# Patient Record
Sex: FEMALE | Race: WHITE | NOT HISPANIC OR LATINO | Employment: UNEMPLOYED | URBAN - METROPOLITAN AREA
[De-identification: names, ages, dates, MRNs, and addresses within clinical notes are randomized per-mention and may not be internally consistent; named-entity substitution may affect disease eponyms.]

---

## 2023-03-27 PROBLEM — I89.0 LYMPHEDEMA OF FACE: Status: ACTIVE | Noted: 2023-03-27

## 2023-03-27 PROBLEM — R53.83 FATIGUE: Status: ACTIVE | Noted: 2023-03-27

## 2023-03-27 PROBLEM — J45.909 ASTHMA (HHS-HCC): Status: ACTIVE | Noted: 2023-03-27

## 2023-03-27 PROBLEM — B00.1 RECURRENT COLD SORES: Status: ACTIVE | Noted: 2023-03-27

## 2023-03-27 PROBLEM — R73.09 ELEVATED GLUCOSE: Status: ACTIVE | Noted: 2023-03-27

## 2023-03-27 PROBLEM — I89.0 LYMPHEDEMA: Status: ACTIVE | Noted: 2023-03-27

## 2023-03-27 PROBLEM — R79.89 ELEVATED LFTS: Status: ACTIVE | Noted: 2023-03-27

## 2023-03-27 PROBLEM — R05.8 POST-VIRAL COUGH SYNDROME: Status: ACTIVE | Noted: 2023-03-27

## 2023-03-27 PROBLEM — M43.6 NECK STIFFNESS: Status: ACTIVE | Noted: 2023-03-27

## 2023-03-27 PROBLEM — R49.0 VOICE HOARSENESS: Status: ACTIVE | Noted: 2023-03-27

## 2023-03-27 PROBLEM — J31.0 RHINITIS: Status: ACTIVE | Noted: 2023-03-27

## 2023-03-27 PROBLEM — D05.90 BREAST CANCER IN SITU: Status: ACTIVE | Noted: 2023-03-27

## 2023-03-27 PROBLEM — R05.9 COUGH: Status: ACTIVE | Noted: 2023-03-27

## 2023-03-27 PROBLEM — M62.81 GENERALIZED MUSCLE WEAKNESS: Status: ACTIVE | Noted: 2023-03-27

## 2023-03-27 PROBLEM — I97.2 LYMPHEDEMA SYNDROME, POSTMASTECTOMY: Status: ACTIVE | Noted: 2023-03-27

## 2023-03-27 RX ORDER — ALBUTEROL SULFATE 90 UG/1
1-2 AEROSOL, METERED RESPIRATORY (INHALATION) AS NEEDED
COMMUNITY
Start: 2015-09-29 | End: 2023-12-07 | Stop reason: SDUPTHER

## 2023-03-27 RX ORDER — CHOLECALCIFEROL (VITAMIN D3) 50 MCG
TABLET ORAL
COMMUNITY
Start: 2015-07-08

## 2023-03-27 RX ORDER — FLUTICASONE PROPIONATE 50 MCG
1-2 SPRAY, SUSPENSION (ML) NASAL DAILY
COMMUNITY

## 2023-03-27 RX ORDER — FLUTICASONE PROPIONATE 110 UG/1
1 AEROSOL, METERED RESPIRATORY (INHALATION)
COMMUNITY
End: 2023-05-09 | Stop reason: CLARIF

## 2023-03-28 ENCOUNTER — OFFICE VISIT (OUTPATIENT)
Dept: PRIMARY CARE | Facility: CLINIC | Age: 58
End: 2023-03-28
Payer: COMMERCIAL

## 2023-03-28 VITALS
HEART RATE: 100 BPM | BODY MASS INDEX: 26.61 KG/M2 | HEIGHT: 66 IN | SYSTOLIC BLOOD PRESSURE: 141 MMHG | DIASTOLIC BLOOD PRESSURE: 85 MMHG | OXYGEN SATURATION: 97 % | TEMPERATURE: 97.8 F | WEIGHT: 165.6 LBS

## 2023-03-28 DIAGNOSIS — J02.9 SORE THROAT: Primary | ICD-10-CM

## 2023-03-28 DIAGNOSIS — B30.9 VIRAL CONJUNCTIVITIS OF BOTH EYES: ICD-10-CM

## 2023-03-28 PROCEDURE — 1036F TOBACCO NON-USER: CPT | Performed by: STUDENT IN AN ORGANIZED HEALTH CARE EDUCATION/TRAINING PROGRAM

## 2023-03-28 PROCEDURE — 99213 OFFICE O/P EST LOW 20 MIN: CPT | Performed by: STUDENT IN AN ORGANIZED HEALTH CARE EDUCATION/TRAINING PROGRAM

## 2023-03-28 RX ORDER — AMOXICILLIN AND CLAVULANATE POTASSIUM 875; 125 MG/1; MG/1
875 TABLET, FILM COATED ORAL 2 TIMES DAILY
Qty: 20 TABLET | Refills: 0 | Status: SHIPPED | OUTPATIENT
Start: 2023-03-28 | End: 2023-04-07

## 2023-03-28 RX ORDER — TOBRAMYCIN 3 MG/ML
1 SOLUTION/ DROPS OPHTHALMIC 4 TIMES DAILY
COMMUNITY
End: 2023-12-21 | Stop reason: ALTCHOICE

## 2023-03-28 ASSESSMENT — ENCOUNTER SYMPTOMS
LOSS OF SENSATION IN FEET: 0
COUGH: 1
RHINORRHEA: 1
NAUSEA: 0
OCCASIONAL FEELINGS OF UNSTEADINESS: 0
VOMITING: 0
SHORTNESS OF BREATH: 0
DEPRESSION: 0
CHILLS: 0
SORE THROAT: 1
FEVER: 0
DIAPHORESIS: 0

## 2023-03-28 ASSESSMENT — PATIENT HEALTH QUESTIONNAIRE - PHQ9
2. FEELING DOWN, DEPRESSED OR HOPELESS: NOT AT ALL
SUM OF ALL RESPONSES TO PHQ9 QUESTIONS 1 AND 2: 0
1. LITTLE INTEREST OR PLEASURE IN DOING THINGS: NOT AT ALL

## 2023-03-28 NOTE — ASSESSMENT & PLAN NOTE
Symptoms seem most c/w viral conjunctivitis. Can continue tobramycin drops for total of 10 days but suspect this is mostly viral. F/u if no improvement. Advised good hand hygiene and dab not rub the eyes.

## 2023-03-28 NOTE — ASSESSMENT & PLAN NOTE
Given sore throat and ear discharge and also travelling to [place] tomorrow will empirically treat with course of augmentin x10 days given unable to follow up. This should cover strep throat as well as most bacterial URIs. Advised common side effects of nausea, diarrhea, and advised probiotic/yogurt with abx. She will do home COVID test.

## 2023-04-13 ENCOUNTER — TELEPHONE (OUTPATIENT)
Dept: PRIMARY CARE | Facility: CLINIC | Age: 58
End: 2023-04-13
Payer: COMMERCIAL

## 2023-04-13 DIAGNOSIS — R05.3 CHRONIC COUGH: Primary | ICD-10-CM

## 2023-04-13 NOTE — TELEPHONE ENCOUNTER
Patient states that she has been using her steroid inhaler 1 in AM and 1 in PM. Patient also has been using albuterol inhaler approx 4x/day. Patient states that Monday night her Oxygen level went down to 91%, currently it is 95%. Is there something else she can do to help alleviate the symptoms? Please advise at home number.

## 2023-04-26 ENCOUNTER — TELEPHONE (OUTPATIENT)
Dept: PRIMARY CARE | Facility: CLINIC | Age: 58
End: 2023-04-26
Payer: COMMERCIAL

## 2023-04-26 NOTE — TELEPHONE ENCOUNTER
4/26/23 cl spoke with pt gave her main referral number [phone] to schedule  POS- pulmonary function test and referral for pulmonology

## 2023-05-09 DIAGNOSIS — J45.20 MILD INTERMITTENT ASTHMA WITHOUT COMPLICATION (HHS-HCC): Primary | ICD-10-CM

## 2023-05-09 RX ORDER — BUDESONIDE 90 UG/1
1 AEROSOL, POWDER RESPIRATORY (INHALATION)
COMMUNITY
End: 2023-05-09 | Stop reason: SDUPTHER

## 2023-05-09 RX ORDER — BUDESONIDE 90 UG/1
1 AEROSOL, POWDER RESPIRATORY (INHALATION)
Qty: 1 EACH | Refills: 3 | Status: SHIPPED | OUTPATIENT
Start: 2023-05-09 | End: 2024-02-06 | Stop reason: WASHOUT

## 2023-05-16 ENCOUNTER — TELEPHONE (OUTPATIENT)
Dept: PRIMARY CARE | Facility: CLINIC | Age: 58
End: 2023-05-16

## 2023-05-16 NOTE — TELEPHONE ENCOUNTER
Pt is calling for the results of her PFT test.    No results in EMR or Epic yet.    Pt had it done on 05.04    Called and let her know that the results take about 2 weeks to come back.    Verbal understanding.     movement/positioning

## 2023-06-28 ENCOUNTER — TELEPHONE (OUTPATIENT)
Dept: PRIMARY CARE | Facility: CLINIC | Age: 58
End: 2023-06-28
Payer: COMMERCIAL

## 2023-06-28 NOTE — TELEPHONE ENCOUNTER
Pt had a lung function test about 6 or 8 weeks ago - would like a call back on the results - please

## 2023-06-28 NOTE — TELEPHONE ENCOUNTER
Please review and advise.    See message from 06/21 also, was this already reviewed?    I see a orders only but there is nothing in the encounter.

## 2023-10-03 ENCOUNTER — TREATMENT (OUTPATIENT)
Dept: OCCUPATIONAL THERAPY | Facility: CLINIC | Age: 58
End: 2023-10-03
Payer: COMMERCIAL

## 2023-10-03 DIAGNOSIS — R29.898 WEAKNESS OF LEFT ARM: Primary | ICD-10-CM

## 2023-10-03 DIAGNOSIS — S52.502A CLOSED FRACTURE OF LEFT DISTAL RADIUS: ICD-10-CM

## 2023-10-03 PROCEDURE — 97112 NEUROMUSCULAR REEDUCATION: CPT | Mod: GO

## 2023-10-03 NOTE — PROGRESS NOTES
Assessed    · Distal radius fracture, left (813.42) (S52.502A)   · Generalized muscle weakness (728.87) (M62.81)   · Weakness of left arm (729.89) (R29.898)    Plan    Intervention plan include:. Continue with occupational therapy progress with motion and strengthening. Progress towards ADL and home/work independence along with functional strengthening. OT to continue this date with lymphedema treatment provided by clinical specialist being continued as scheduled by client.   Frequency and duration:.2 times a week. This is under her approved limit of 60 visits a year.     Will continue to provide alternate treatment of client with OT clinicians for this DX and her lymphedema treatment. Recommend continued left wrist therapy to promote functional return.    Progress with POC, as tolerated.    Monitor home program.      Assessment      Patients Response to Today's Treatment: hypersensitivity: decreased, joint mobility/rom: increased, strength: increased and pain/symptoms: decreased. flexibility: increased   Patient was able to verbalized precautions and demonstrated/taught back good understanding of personalized exercise program as verbalized, demonstrated and/or provided in printed format.   The client tolerated well. Gross wrist mobility against gravity and sustained light mobilizations being tolerated. Supination gains with increased positional tolerance and functional ability continue to be noted. All therapeutic exercises, modalities and activities performed this date to reduce pain, increase hand function, increase strength and independence in ADL and IADL tasks.   Patient was able to complete today's treatment with some difficulty.      Adult Risk Screening  There are no spiritual/cultural practices/values/needs that are important to know   Initial Fall Risk Screening:   NIDIA MERINO has fallen in the last 6 months. Her fall resulted in the following injury: . NIDIA MERINO does not have a fear of falling. She does not  need assistance with sitting, standing or walking. Does not need assistance walking in her home. She does not need assistance in an unfamiliar setting. The patient is not using an assistive device.             Insurance    Insurance reviewed   Visit number: 38   Approved number of visits: 60   20th for left wrist specific treatment after distal radius fracture.        Subjective    Patient reports:.   I use the arm a lot over the weekend.  Objective     Not Limited Improving Painful Limited   Carrying  X  X   Gripping  X X X   Lifting  X X X   Manipulation  X  X   Pinching  X  X   Pulling  X  X   Pushing  X  X   Reaching  X  X       Completing increased home tasks with less pain. AROM is increasing for supination of the forearm and flexion of the wrist. Less painful while returning to pronation. Noted increased movement tolerance that incorporates the left arm with supination/pronation exercises and elbow flexion curls. Left  22 pounds, right  45 pounds.     Treatment    Time in clinic started at 1300  Time in clinic ended at 1340  Total time in clinic is 40 minutes.      Neuromuscular Re-education (43525): timed minutes 40 . Sustained safe stretch with increase task tolerance and performance. Supination increasing. Intrinsic stretches, extrinsic stretches, edema control technique’s, range-of-motion activities, home program education, and strengthening activities.     Nabil Bahena OTR/L, CHT

## 2023-10-06 ENCOUNTER — APPOINTMENT (OUTPATIENT)
Dept: OCCUPATIONAL THERAPY | Facility: CLINIC | Age: 58
End: 2023-10-06
Payer: COMMERCIAL

## 2023-10-10 ENCOUNTER — APPOINTMENT (OUTPATIENT)
Dept: OCCUPATIONAL THERAPY | Facility: CLINIC | Age: 58
End: 2023-10-10
Payer: COMMERCIAL

## 2023-10-11 ENCOUNTER — APPOINTMENT (OUTPATIENT)
Dept: OCCUPATIONAL THERAPY | Facility: CLINIC | Age: 58
End: 2023-10-11
Payer: COMMERCIAL

## 2023-10-13 ENCOUNTER — APPOINTMENT (OUTPATIENT)
Dept: OCCUPATIONAL THERAPY | Facility: CLINIC | Age: 58
End: 2023-10-13
Payer: COMMERCIAL

## 2023-10-17 ENCOUNTER — TREATMENT (OUTPATIENT)
Dept: OCCUPATIONAL THERAPY | Facility: CLINIC | Age: 58
End: 2023-10-17
Payer: COMMERCIAL

## 2023-10-17 DIAGNOSIS — R29.898 WEAKNESS OF LEFT ARM: Primary | ICD-10-CM

## 2023-10-17 DIAGNOSIS — S52.502A CLOSED FRACTURE OF LEFT DISTAL RADIUS: ICD-10-CM

## 2023-10-17 PROCEDURE — 97112 NEUROMUSCULAR REEDUCATION: CPT | Mod: GO

## 2023-10-17 NOTE — PROGRESS NOTES
Assessed    · Distal radius fracture, left (813.42) (S52.502A)   · Generalized muscle weakness (728.87) (M62.81)   · Weakness of left arm (729.89) (R29.898)    Plan    Intervention plan include:. Continue with occupational therapy progress with motion and strengthening. Progress towards ADL and home/work independence along with functional strengthening. OT to continue this date with lymphedema treatment provided by clinical specialist being continued as scheduled by client.   Frequency and duration:. 2 times a week. This is under her approved limit of 60 visits a year.     Will continue to provide alternate treatment of client with OT clinicians for this DX and her lymphedema treatment. Recommend continued left wrist therapy to promote functional return.    Progress with POC, as tolerated.    Monitor home program.      Assessment      Patients Response to Today's Treatment: hypersensitivity: decreased, joint mobility/rom: increased, strength: increased and pain/symptoms: decreased. flexibility: increased   Patient was able to verbalized precautions and demonstrated/taught back good understanding of personalized exercise program as verbalized, demonstrated and/or provided in printed format.   The client tolerated well. Gross wrist mobility against gravity and sustained light mobilizations being tolerated. Supination gains with increased positional tolerance and functional ability continue to be noted. All therapeutic exercises, modalities and activities performed this date to reduce pain, increase hand function, increase strength and independence in ADL and IADL tasks.   Patient was able to complete today's treatment with some difficulty.      Adult Risk Screening  There are no spiritual/cultural practices/values/needs that are important to know.  No apparent abuse or neglect is noted, no suicidal ideation or self-harm plans referenced.    Initial Fall Risk Screening:   NIDIA MERINO has fallen in the last 6 months. Her  fall resulted in the following injury: . NIDIA MERINO does not have a fear of falling. She does not need assistance with sitting, standing or walking. Does not need assistance walking in her home. She does not need assistance in an unfamiliar setting. The patient is not using an assistive device.             Insurance    Insurance reviewed   Visit number: 39   Approved number of visits: 60   21th for left wrist specific treatment after distal radius fracture.        Subjective    Patient reports:.   I use the arm a lot over the weekend.  Objective:    Left  24 pounds, right  45 pounds.     Not Limited Improving Painful Limited   Carrying  X  X   Gripping  X X X   Lifting  X X X   Manipulation  X  X   Pinching  X  X   Pulling  X  X   Pushing  X  X   Reaching  X  X       Completing increased home tasks with less pain. AROM is increasing for supination of the forearm and flexion of the wrist. Less painful while returning to pronation. Noted increased movement tolerance that incorporates the left arm with supination/pronation exercises and elbow flexion curls.      Treatment    Time in clinic started at 1415  Time in clinic ended at 1455  Total time in clinic is 40 minutes.      Neuromuscular Re-education (65349): timed minutes 40 . Sustained safe stretch with increase task tolerance and performance. Supination increasing. Intrinsic stretches, extrinsic stretches, edema control technique’s, range-of-motion activities, home program education, and strengthening activities.     Nabil Bahena OTR/L, CHT

## 2023-10-23 ENCOUNTER — TREATMENT (OUTPATIENT)
Dept: OCCUPATIONAL THERAPY | Facility: CLINIC | Age: 58
End: 2023-10-23
Payer: COMMERCIAL

## 2023-10-23 DIAGNOSIS — R29.898 WEAKNESS OF LEFT ARM: Primary | ICD-10-CM

## 2023-10-23 DIAGNOSIS — S52.502A CLOSED FRACTURE OF LEFT DISTAL RADIUS: ICD-10-CM

## 2023-10-23 DIAGNOSIS — I89.0 LYMPHEDEMA: ICD-10-CM

## 2023-10-23 PROCEDURE — 97112 NEUROMUSCULAR REEDUCATION: CPT | Mod: GO

## 2023-10-23 NOTE — PROGRESS NOTES
Assessed    · Distal radius fracture, left (813.42) (S52.502A)   · Generalized muscle weakness (728.87) (M62.81)   · Weakness of left arm (729.89) (R29.898)    Plan    Intervention plan include:. Continue with occupational therapy progress with motion and strengthening. Progress towards ADL and home/work independence along with functional strengthening. OT to continue this date with lymphedema treatment provided by clinical specialist being continued as scheduled by client.   Frequency and duration:. 2 times a week. This is under her approved limit of 60 visits a year.     Will continue to provide alternate treatment of client with OT clinicians for this DX and her lymphedema treatment. Recommend continued left wrist therapy to promote functional return.    Progress with POC, as tolerated.    Monitor home program.      Assessment      Patients Response to Today's Treatment: hypersensitivity: decreased, joint mobility/rom: increased, strength: increased and pain/symptoms: decreased. flexibility: increased   Patient was able to verbalized precautions and demonstrated/taught back good understanding of personalized exercise program as verbalized, demonstrated and/or provided in printed format.   The client tolerated well. Gross wrist mobility against gravity and sustained light mobilizations being tolerated. Supination gains with increased positional tolerance and functional ability continue to be noted. All therapeutic exercises, modalities and activities performed this date to reduce pain, increase hand function, increase strength and independence in ADL and IADL tasks.   Patient was able to complete today's treatment with some difficulty.      Adult Risk Screening  There are no spiritual/cultural practices/values/needs that are important to know.  No apparent abuse or neglect is noted, no suicidal ideation or self-harm plans referenced.    Initial Fall Risk Screening:   NIDIA MERINO has fallen in the last 6 months. Her  fall resulted in the following injury: . NIDIA MERINO does not have a fear of falling. She does not need assistance with sitting, standing or walking. Does not need assistance walking in her home. She does not need assistance in an unfamiliar setting. The patient is not using an assistive device.             Insurance    Insurance reviewed   Visit number: 40   Approved number of visits: 60   22th for left wrist specific treatment after distal radius fracture.        Subjective    Patient reports:.   I am having pain on the inside of the wrist.  The lymphedema is a little worse today.   Objective:     Not Limited Improving Painful Limited   Carrying  X  X   Gripping  X X X   Lifting  X X X   Manipulation  X  X   Pinching  X  X   Pulling  X  X   Pushing  X  X   Reaching  X  X       Pain complaints today after weekend use.  Strength remains limited but gains noted. AROM is increasing for supination of the forearm and flexion of the wrist. Supination 75 degrees.  Less painful while returning to pronation. Noted increased movement tolerance that incorporates the left arm with supination/pronation exercises and elbow flexion curls.      Treatment    Time in clinic started at 1520  Time in clinic ended at 1605  Total time in clinic is 45 minutes.      Neuromuscular Re-education (39212): timed minutes 45 . Sustained safe stretch with increase task tolerance and performance. Supination increasing. Intrinsic stretches, extrinsic stretches, edema control technique’s, range-of-motion activities, home program education, and strengthening activities.     Nabil Bahena OTR/L, CHT

## 2023-10-25 ENCOUNTER — TREATMENT (OUTPATIENT)
Dept: OCCUPATIONAL THERAPY | Facility: CLINIC | Age: 58
End: 2023-10-25
Payer: COMMERCIAL

## 2023-10-25 DIAGNOSIS — I89.0 LYMPHEDEMA OF FACE: Primary | ICD-10-CM

## 2023-10-25 DIAGNOSIS — I97.2 LYMPHEDEMA SYNDROME, POSTMASTECTOMY: ICD-10-CM

## 2023-10-25 DIAGNOSIS — I89.0 LYMPHEDEMA: ICD-10-CM

## 2023-10-25 DIAGNOSIS — M43.6 NECK STIFFNESS: ICD-10-CM

## 2023-10-25 PROCEDURE — 97535 SELF CARE MNGMENT TRAINING: CPT | Mod: GO

## 2023-10-25 PROCEDURE — 97140 MANUAL THERAPY 1/> REGIONS: CPT | Mod: GO

## 2023-10-25 NOTE — PROGRESS NOTES
Occupational Therapy    Occupational Therapy Treatment    Name: Miroslava Roman  MRN: [id number]  : 1965  Date: 10/25/23  Visit #41   Approved number of visits 60    Assessment:  Abdominal softening, still full.  Left arm less tight, more comfortable per pt.  Face and neck less full, less discomfort, voice clearer, less strained.     Plan:  Frequency and duration:. Continue occupational therapy for modified trunk, abdominal, head and neck, UE lymphedema complete decongestive therapy (CDT), exercises, stretches for decongestion and mobility, pain management, increase optimal function; 1x for every other week. Increase frequency as needed. Modify, upgrade tx/strategies due to pt fluctuating swelling and pain levels. Support voice function.       Subjective   Pt in abdominal distress, has distention (bowel, lymphatic). Legs are swelling.  Left arm swollen, hand therapy with left hand stretching Monday.  Pt feels fullness in throat, scratchy.      Pain Assessment:  Abdominal pain reported.     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   Face: face (bilaterally) midface nose upper lip   Cheek:full bilaterally  Jowl:/Jaw line: full bilaterally   Lower Eyelid: puffy      Additional Information: Full abdomen, firm, distended, painful per pt  Face and neck full. Eyes full.   Post neck tight.  Bilateral axilla full.  Voice hoarse.  L arm, hand tight, full.           Treatment    Time in  1330   Time out 1425   Total time 55 minutes.     Manual Therapy 40   OT provide deep and superficial abdominal manual lymph drainage (MLD) to pt tolerance, abdomen tight, painful per pt.  OT clear inguinals, abdominal scar massage, clear axilla bilateral, clear chest, neck, face. Soft tissue stretches shoulder,  post neck.   OT provide LUE MLD.   Repeat abdominal MLD clearing after exercises.  Pt reported less fullness, tightness throughout post tx. Abdomen full, less pain.     Therapeutic exercise 15   Supine: knees to chest, hip rotations. Pt report this tight, slow pace with movement.  Supine hip ER/IR 10 reps x2, slow pace  Neck ROM, shoulder ROM when seated.  Exercises intermittent with MLD.  All exercises slow pace to pt tolerance.   Pt report use of large therapy ball (stretches, bouncing) at home.

## 2023-10-31 ENCOUNTER — TREATMENT (OUTPATIENT)
Dept: OCCUPATIONAL THERAPY | Facility: CLINIC | Age: 58
End: 2023-10-31
Payer: COMMERCIAL

## 2023-10-31 DIAGNOSIS — S52.502A CLOSED FRACTURE OF LEFT DISTAL RADIUS: ICD-10-CM

## 2023-10-31 DIAGNOSIS — M62.81 GENERALIZED MUSCLE WEAKNESS: ICD-10-CM

## 2023-10-31 DIAGNOSIS — R29.898 WEAKNESS OF LEFT ARM: Primary | ICD-10-CM

## 2023-10-31 PROCEDURE — 97112 NEUROMUSCULAR REEDUCATION: CPT | Mod: GO

## 2023-10-31 NOTE — PROGRESS NOTES
Occupational Therapy    Patient Name: Miroslava Roman  MRN: [id number]  Today's Date: 10/31/2023     Assessed    · Distal radius fracture, left (813.42) (S52.502A)   · Generalized muscle weakness (728.87) (M62.81)   · Weakness of left arm (729.89) (R29.898)    Plan    Intervention plan include:. Continue with occupational therapy progress with motion and strengthening. Progress towards ADL and home/work independence along with functional strengthening. OT to continue this date with lymphedema treatment provided by clinical specialist being continued as scheduled by client.   Frequency and duration:. 2 times a week. This is under her approved limit of 60 visits a year.     Will continue to provide alternate treatment of client with OT clinicians for this DX and her lymphedema treatment. Recommend continued left wrist therapy to promote functional return.    Progress with POC, as tolerated.    Monitor home program.      Assessment      Patients Response to Today's Treatment: hypersensitivity: decreased, joint mobility/rom: increased, strength: increased and pain/symptoms: decreased. flexibility: increased   Patient was able to verbalized precautions and demonstrated/taught back good understanding of personalized exercise program as verbalized, demonstrated and/or provided in printed format.   The client tolerated well. Gross wrist mobility against gravity and sustained light mobilizations being tolerated. Supination gains with increased positional tolerance and functional ability continue to be noted. Sustained supination to 80 degrees with limited rebound pain.  She did home lymphedema treatment to left forearm and wrist prior to attending today. All therapeutic exercises, modalities and activities performed this date to reduce pain, increase hand function, increase strength and independence in ADL and IADL tasks.   Patient was able to complete today's treatment with some difficulty.      Adult Risk Screening  There are  no spiritual/cultural practices/values/needs that are important to know.  No apparent abuse or neglect is noted, no suicidal ideation or self-harm plans referenced.      Initial Fall Risk Screening:   NIDIA MERINO has fallen in the last 6 months. Her fall resulted in the following injury: . NIDIA MERINO does not have a fear of falling. She does not need assistance with sitting, standing or walking. Does not need assistance walking in her home. She does not need assistance in an unfamiliar setting. The patient is not using an assistive device.             Insurance    Insurance reviewed   Visit number: 41   Approved number of visits: 60   23th for left wrist specific treatment after distal radius fracture.        Subjective    Patient reports:.   I am having pain on the inside of the wrist.  The lymphedema is a little worse today.   Objective:     Not Limited Improving Painful Limited   Carrying  X     Gripping  X X    Lifting  X X    Manipulation  X     Pinching  X     Pulling  X     Pushing  X     Reaching  X         Strength remains limited but gains noted.  Functional task tolerance increasing. AROM is increasing for supination of the forearm and flexion of the wrist. Supination 80 degrees.  Less painful while returning to pronation. Noted increased movement tolerance that incorporates the left arm with supination/pronation exercises and elbow flexion curls.      Treatment    Time in clinic started at 1430  Time in clinic ended at 1515  Total time in clinic is 45 minutes.      Neuromuscular Re-education (43838): timed minutes 45 . Sustained safe stretch with increase task tolerance and performance. Supination increasing. Intrinsic stretches, extrinsic stretches, edema control technique’s, range-of-motion activities, home program education, and strengthening activities.     Nabil Bahena OTR/L, CHT

## 2023-11-02 ENCOUNTER — TREATMENT (OUTPATIENT)
Dept: OCCUPATIONAL THERAPY | Facility: CLINIC | Age: 58
End: 2023-11-02
Payer: COMMERCIAL

## 2023-11-02 DIAGNOSIS — S52.572D OTHER CLOSED INTRA-ARTICULAR FRACTURE OF DISTAL END OF LEFT RADIUS WITH ROUTINE HEALING, SUBSEQUENT ENCOUNTER: ICD-10-CM

## 2023-11-02 DIAGNOSIS — R29.898 WEAKNESS OF LEFT ARM: Primary | ICD-10-CM

## 2023-11-02 PROCEDURE — 97112 NEUROMUSCULAR REEDUCATION: CPT | Mod: GO

## 2023-11-02 NOTE — PROGRESS NOTES
Occupational Therapy    Patient Name: Miroslava Roman  MRN: [id number]  Today's Date: 11/2/2023     Assessed    · Distal radius fracture, left (813.42) (S52.502A)   · Generalized muscle weakness (728.87) (M62.81)   · Weakness of left arm (729.89) (R29.898)    Plan    Intervention plan include:. Continue with occupational therapy progress with motion and strengthening. Progress towards ADL and home/work independence along with functional strengthening. OT to continue this date with lymphedema treatment provided by clinical specialist being continued as scheduled by client.   Frequency and duration:. 2 times a week. This is under her approved limit of 60 visits a year.     Will continue to provide alternate treatment of client with OT clinicians for this DX and her lymphedema treatment. Recommend continued left wrist therapy to promote functional return.    Progress with POC, as tolerated.    Monitor home program.      Assessment      Patients Response to Today's Treatment: hypersensitivity: decreased, joint mobility/rom: increased, strength: increased and pain/symptoms: decreased. flexibility: increased   Patient was able to verbalized precautions and demonstrated/taught back good understanding of personalized exercise program as verbalized, demonstrated and/or provided in printed format.   The client tolerated well. Gross wrist mobility against gravity and sustained light mobilizations being tolerated. Supination gains with increased positional tolerance and functional ability continue to be noted. Sustained supination to 80 degrees with limited rebound pain.  She did home lymphedema treatment to left forearm and wrist prior to attending today. All therapeutic exercises, modalities and activities performed this date to reduce pain, increase hand function, increase strength and independence in ADL and IADL tasks.   Patient was able to complete today's treatment with some difficulty.      Adult Risk Screening  There are  no spiritual/cultural practices/values/needs that are important to know.  No apparent abuse or neglect is noted, no suicidal ideation or self-harm plans referenced.      Initial Fall Risk Screening:   NIDIA MERINO has fallen in the last 6 months. Her fall resulted in the following injury: . NIDIA MERINO does not have a fear of falling. She does not need assistance with sitting, standing or walking. Does not need assistance walking in her home. She does not need assistance in an unfamiliar setting. The patient is not using an assistive device.             Insurance    Insurance reviewed   Visit number: 42   Approved number of visits: 60   24th for left wrist specific treatment after distal radius fracture.        Subjective    Patient reports:.   I am still not able to use the wrist to scoot up on the bead when I sit down.    Objective:     Not Limited Improving Painful Limited   Carrying  X     Gripping  X X    Lifting  X X    Manipulation  X     Pinching  X     Pulling  X     Pushing  X     Reaching  X         Strength remains limited but gains noted.  Functional task tolerance increasing. AROM is increasing for supination of the forearm and flexion of the wrist. Supination 80 degrees.  Wrist flexion 70 degrees, extension 65 degrees.  Less painful while returning to neutral position. Noted increased movement tolerance that incorporates the left arm with supination/pronation exercises and elbow flexion curls.      Treatment    Time in clinic started at 1430  Time in clinic ended at 1515  Total time in clinic is 45 minutes.      Neuromuscular Re-education (15669): timed minutes 45 . Sustained safe stretch with increase task tolerance and performance. Supination increasing. Intrinsic stretches, extrinsic stretches, edema control technique’s, range-of-motion activities, home program education, and strengthening activities.     Nabil Bahena OTR/L, CHT

## 2023-11-06 ENCOUNTER — TREATMENT (OUTPATIENT)
Dept: OCCUPATIONAL THERAPY | Facility: CLINIC | Age: 58
End: 2023-11-06
Payer: COMMERCIAL

## 2023-11-06 DIAGNOSIS — S52.572D OTHER CLOSED INTRA-ARTICULAR FRACTURE OF DISTAL END OF LEFT RADIUS WITH ROUTINE HEALING, SUBSEQUENT ENCOUNTER: ICD-10-CM

## 2023-11-06 DIAGNOSIS — R29.898 WEAKNESS OF LEFT ARM: Primary | ICD-10-CM

## 2023-11-06 DIAGNOSIS — M62.81 GENERALIZED MUSCLE WEAKNESS: ICD-10-CM

## 2023-11-06 PROCEDURE — 97112 NEUROMUSCULAR REEDUCATION: CPT | Mod: GO

## 2023-11-06 NOTE — PROGRESS NOTES
Occupational Therapy    Patient Name: Miroslava Roman  MRN: [id number]  Today's Date: 11/6/2023     Assessed    · Distal radius fracture, left (813.42) (S52.502A)   · Generalized muscle weakness (728.87) (M62.81)   · Weakness of left arm (729.89) (R29.898)    Plan    Intervention plan include:. Continue with occupational therapy progress with motion and strengthening. Progress towards ADL and home/work independence along with functional strengthening. OT to continue this date with lymphedema treatment provided by clinical specialist being continued as scheduled by client.   Frequency and duration:. 1 visit with discharge planning for next session for distal radius fracture. Other OT treatment to continue for lymphedema.     Progress with POC, as tolerated.    Monitor home program.      Assessment      Patients Response to Today's Treatment: hypersensitivity: decreased, joint mobility/rom: increased, strength: increased and pain/symptoms: decreased. flexibility: increased   Patient was able to verbalized precautions and demonstrated/taught back good understanding of personalized exercise program as verbalized, demonstrated and/or provided in printed format.   The client tolerated well. Gross wrist mobility against gravity and sustained light mobilizations being tolerated. Supination gains with increased positional tolerance and functional ability continue to be noted. Sustained supination to 80 degrees with limited rebound pain.  She performed another home lymphedema treatment to left forearm and wrist prior to attending today. All therapeutic exercises, modalities and activities performed this date to reduce pain, increase hand function, increase strength and independence in ADL and IADL tasks.   Patient was able to complete today's treatment with some difficulty.      Adult Risk Screening  There are no spiritual/cultural practices/values/needs that are important to know.  No apparent abuse or neglect is noted, no  suicidal ideation or self-harm plans referenced.      Initial Fall Risk Screening:   NIDIA MERINO has fallen in the last 6 months. Her fall resulted in the following injury: . NIDIA MERINO does not have a fear of falling. She does not need assistance with sitting, standing or walking. Does not need assistance walking in her home. She does not need assistance in an unfamiliar setting. The patient is not using an assistive device.             Insurance    Insurance reviewed   Visit number: 43   Approved number of visits: 60   25th for left wrist specific treatment after distal radius fracture.        Subjective    Patient reports:.   I am still not able to use the wrist to scoot up on the bead when I sit down.    Objective:     Not Limited Improving Painful Limited   Carrying  X     Gripping  X X    Lifting  X X    Manipulation  X     Pinching  X     Pulling  X     Pushing  X     Reaching  X         Strength remains limited but gains noted.  Functional task tolerance increasing. AROM is increasing for supination of the forearm and flexion of the wrist. Supination 80 degrees.  Wrist flexion 70 degrees, extension 70 degrees.  Left  30 pounds, right  48 pounds.  Noted increased movement tolerance that incorporates the left arm with supination/pronation exercises and elbow flexion curls.      Treatment    Time in clinic started at 1445  Time in clinic ended at 1525  Total time in clinic is 40 minutes.      Neuromuscular Re-education (31795): timed minutes 40 . Sustained safe stretch with increase task tolerance and performance. Supination increasing. Intrinsic stretches, extrinsic stretches, edema control technique’s, range-of-motion activities, home program education, and strengthening activities.     Nabil Bahena OTR/L, CHT

## 2023-11-08 ENCOUNTER — TREATMENT (OUTPATIENT)
Dept: OCCUPATIONAL THERAPY | Facility: CLINIC | Age: 58
End: 2023-11-08
Payer: COMMERCIAL

## 2023-11-08 DIAGNOSIS — I97.2 LYMPHEDEMA SYNDROME, POSTMASTECTOMY: ICD-10-CM

## 2023-11-08 DIAGNOSIS — I89.0 LYMPHEDEMA: ICD-10-CM

## 2023-11-08 DIAGNOSIS — M43.6 NECK STIFFNESS: ICD-10-CM

## 2023-11-08 DIAGNOSIS — I89.0 LYMPHEDEMA OF FACE: Primary | ICD-10-CM

## 2023-11-08 PROCEDURE — 97140 MANUAL THERAPY 1/> REGIONS: CPT | Mod: GO,CO

## 2023-11-08 PROCEDURE — 97110 THERAPEUTIC EXERCISES: CPT | Mod: GO,CO

## 2023-11-08 NOTE — PROGRESS NOTES
"Occupational Therapy    Occupational Therapy Treatment    Name: Mirsolava Roman  MRN: [id number]  : 1965  Date: 23  Visit #45Time Calculation  Start Time: 1103  Stop Time: 1200  Time Calculation (min): 57 min  Approved number of visits 60    Assessment:  Abdominal softening, still full.  Left arm less tight, more comfortable per pt.  Face and neck less full, less discomfort, voice clearer, less strained.     Plan:  Frequency and duration:. Continue occupational therapy for modified trunk, abdominal, head and neck, UE lymphedema complete decongestive therapy (CDT), exercises, stretches for decongestion and mobility, pain management, increase optimal function; 1x for every other week. Increase frequency as needed. Modify, upgrade tx/strategies due to pt fluctuating swelling and pain levels. Support voice function.       Subjective   Pt states she has not been working on strengthening as much recently but has continued to stretch. Pt states she is hoping to get back into strengthening as she feels lymphatic fluid building up in legs and abdomen.  Pain Assessment:       Objective    Therapy Diagnosis  1. Lymphedema of face        2. Lymphedema        3. Neck stiffness        4. Lymphedema syndrome, postmastectomy               Objective       Head and neck edema located in the following area(s): .   Face: face (bilaterally) midface nose upper lip   Cheek:full bilaterally  Jowl:/Jaw line: full bilaterally   Lower Eyelid: puffy      Additional Information: Full abdomen, firm, distended, painful per pt  Face and neck full. Eyes full.   Post neck tight.  Bilateral axilla full.  Voice hoarse.  L arm, hand tight, full.           Treatment      Manual Therapy 47   GARCIA provides deep abdominal MLD, B LE MLD, L UE, head and neck MLD. Good tissue texture softening post MLD. Pt states she feels less tight, \"like things are moving\". Decreased tightness, pressure on abdomen.     Therapeutic exercise 10  Supine: knees to " chest, hip rotations.  Stationary bike 5 minutes

## 2023-11-09 ENCOUNTER — APPOINTMENT (OUTPATIENT)
Dept: OCCUPATIONAL THERAPY | Facility: CLINIC | Age: 58
End: 2023-11-09
Payer: COMMERCIAL

## 2023-11-10 ENCOUNTER — TREATMENT (OUTPATIENT)
Dept: OCCUPATIONAL THERAPY | Facility: CLINIC | Age: 58
End: 2023-11-10
Payer: COMMERCIAL

## 2023-11-10 DIAGNOSIS — M43.6 NECK STIFFNESS: ICD-10-CM

## 2023-11-10 DIAGNOSIS — R29.898 WEAKNESS OF LEFT ARM: ICD-10-CM

## 2023-11-10 DIAGNOSIS — I89.0 LYMPHEDEMA OF FACE: ICD-10-CM

## 2023-11-10 DIAGNOSIS — I97.2 LYMPHEDEMA SYNDROME, POSTMASTECTOMY: ICD-10-CM

## 2023-11-10 DIAGNOSIS — I89.0 LYMPHEDEMA: Primary | ICD-10-CM

## 2023-11-10 PROCEDURE — 97140 MANUAL THERAPY 1/> REGIONS: CPT | Mod: GO | Performed by: OCCUPATIONAL THERAPIST

## 2023-11-10 NOTE — PROGRESS NOTES
Occupational Therapy    Occupational Therapy Treatment    Name: Miroslava Roman  MRN: [id number]  : 1965  Date: 11/10/23  Visit #46  Approved number of visits 60    Assessment:  Pt with noted decreased fullness and muscular tightness post treatment.  Patient reported improved mobility in BLES post MLD    Plan:  Frequency and duration:. Continue occupational therapy for modified trunk, abdominal, head and neck, UE lymphedema complete decongestive therapy (CDT), exercises, stretches for decongestion and mobility, pain management, increase optimal function; 1x for every other week. Increase frequency as needed. Modify, upgrade tx/strategies due to pt fluctuating swelling and pain levels. Support voice function.       Subjective   Pt reports her legs feel less tight after last treatment        Objective    Therapy Diagnosis  1. Lymphedema        2. Lymphedema syndrome, postmastectomy        3. Neck stiffness        4. Lymphedema of face        5. Weakness of left arm                 Objective       Head and neck edema located in the following area(s): .   Face: face (bilaterally) midface nose upper lip   Cheek:full bilaterally  Jowl:/Jaw line: full bilaterally   Lower Eyelid: puffy      Additional Information: Full abdomen, firm, distended, painful per pt  Face and neck full. Eyes full.   Post neck tight.  Bilateral axilla full.  Voice hoarse.  L arm, hand tight, full.           Treatment      Manual Therapy 55 minutes  OT provided manual lymph drainage to abdomen, neck, BUEs and BLES  Pt with noted decreased fullness post treatment      Therapeutic exercise 3 minutes     Stationary bike 3 minutes

## 2023-11-13 ENCOUNTER — TREATMENT (OUTPATIENT)
Dept: OCCUPATIONAL THERAPY | Facility: CLINIC | Age: 58
End: 2023-11-13
Payer: COMMERCIAL

## 2023-11-13 DIAGNOSIS — M62.81 GENERALIZED MUSCLE WEAKNESS: Primary | ICD-10-CM

## 2023-11-13 DIAGNOSIS — I89.0 LYMPHEDEMA OF FACE: ICD-10-CM

## 2023-11-13 DIAGNOSIS — R29.898 WEAKNESS OF LEFT ARM: ICD-10-CM

## 2023-11-13 DIAGNOSIS — I89.0 LYMPHEDEMA: ICD-10-CM

## 2023-11-13 PROCEDURE — 97110 THERAPEUTIC EXERCISES: CPT | Mod: GO,CO

## 2023-11-13 PROCEDURE — 97140 MANUAL THERAPY 1/> REGIONS: CPT | Mod: GO,CO

## 2023-11-13 NOTE — PROGRESS NOTES
Occupational Therapy    Occupational Therapy Treatment    Name: Miroslava Roman  MRN: [id number]  : 1965  Date: 23  Visit #47  Time Calculation  Start Time: 1120  Stop Time: 1218  Time Calculation (min): 58 min  Approved number of visits 60    Assessment:  Abdominal softening, still full.  Left arm less tight, more comfortable per pt.  Face and neck less full, less discomfort, voice clearer, less strained.     Plan:  Frequency and duration:. Continue occupational therapy for modified trunk, abdominal, head and neck, UE lymphedema complete decongestive therapy (CDT), exercises, stretches for decongestion and mobility, pain management, increase optimal function; 1x for every other week. Increase frequency as needed. Modify, upgrade tx/strategies due to pt fluctuating swelling and pain levels. Support voice function.       Subjective   Pt states she feels she is getting more to her baseline, legs feels less swollen then last week. Pt states her abdomen and are feel as if they are the usual amount of swollen.   Pain Assessment:       Objective    Therapy Diagnosis  1. Generalized muscle weakness        2. Weakness of left arm        3. Lymphedema of face        4. Lymphedema                   Objective       Head and neck edema located in the following area(s): .   Face: face (bilaterally) midface nose upper lip   Cheek:full bilaterally  Jowl:/Jaw line: full bilaterally   Lower Eyelid: puffy      Additional Information: Full abdomen, firm, distended, painful per pt  Face and neck full. Eyes full.   Post neck tight.  Bilateral axilla full.  Voice hoarse.  L arm, hand tight, full.           Treatment      Manual Therapy 48   GARCIA provides deep abdominal MLD, L UE, head and neck MLD. Good tissue texture softening post MLD. GARCIA uses Graston tools to promote increased lymphatic flow, decreased swelling at shoulders, trapezius for increased ease of functional ADL and IADL tasks.    Good tissue texture softening  post MLD.  Therapeutic exercise 10  Supine: knees to chest, hip rotations.  Stationary bike 5 minutes

## 2023-11-20 ENCOUNTER — TREATMENT (OUTPATIENT)
Dept: OCCUPATIONAL THERAPY | Facility: CLINIC | Age: 58
End: 2023-11-20
Payer: COMMERCIAL

## 2023-11-20 DIAGNOSIS — I89.0 LYMPHEDEMA: Primary | ICD-10-CM

## 2023-11-20 DIAGNOSIS — I89.0 LYMPHEDEMA OF FACE: ICD-10-CM

## 2023-11-20 DIAGNOSIS — M62.81 GENERALIZED MUSCLE WEAKNESS: ICD-10-CM

## 2023-11-20 PROCEDURE — 97140 MANUAL THERAPY 1/> REGIONS: CPT | Mod: GO,CO

## 2023-11-20 PROCEDURE — 97110 THERAPEUTIC EXERCISES: CPT | Mod: GO,CO

## 2023-11-20 NOTE — PROGRESS NOTES
Occupational Therapy    Occupational Therapy Treatment    Name: Miroslava Roman  MRN: [id number]  : 1965  Date: 23  Visit #48  Time Calculation  Start Time: 1403  Stop Time: 1503  Time Calculation (min): 60 min  Approved number of visits 60    Assessment:  Pt appears to tolerate treatment well with no complaints of pain, states she feels fatigued after treatment is over.    Plan:  Frequency and duration:. Continue occupational therapy for modified trunk, abdominal, head and neck, UE lymphedema complete decongestive therapy (CDT), exercises, stretches for decongestion and mobility, pain management, increase optimal function; 1x for every other week. Increase frequency as needed. Modify, upgrade tx/strategies due to pt fluctuating swelling and pain levels. Support voice function.       Subjective   Pt reports feeling as if her back is more swollen   Pain Assessment:       Objective    Therapy Diagnosis  1. Lymphedema        2. Lymphedema of face        3. Generalized muscle weakness                   Objective       Head and neck edema located in the following area(s): .   Face: face (bilaterally) midface nose upper lip   Cheek:full bilaterally  Jowl:/Jaw line: full bilaterally   Lower Eyelid: puffy      Additional Information: Full abdomen, firm, distended, painful per pt  Face and neck full. Eyes full.   Post neck tight.  Bilateral axilla full.  Voice hoarse.  L arm, hand tight, full.           Treatment      Manual Therapy 50   GARCIA provides deep abdominal MLD, L UE, head and neck MLD. Good tissue texture softening post MLD. GARCIA uses Graston tools to promote increased lymphatic flow, at hips, low back. Good tissue texture softening post MLD.   Therapeutic exercise 10  Side bends x5 each side  Hip flexor stretch 3x30 second holds each side  Stationary bike 5 minutes

## 2023-11-22 ENCOUNTER — APPOINTMENT (OUTPATIENT)
Dept: OCCUPATIONAL THERAPY | Facility: CLINIC | Age: 58
End: 2023-11-22
Payer: COMMERCIAL

## 2023-11-29 ENCOUNTER — TREATMENT (OUTPATIENT)
Dept: OCCUPATIONAL THERAPY | Facility: CLINIC | Age: 58
End: 2023-11-29
Payer: COMMERCIAL

## 2023-11-29 DIAGNOSIS — I97.2 LYMPHEDEMA SYNDROME, POSTMASTECTOMY: ICD-10-CM

## 2023-11-29 DIAGNOSIS — I89.0 LYMPHEDEMA OF FACE: ICD-10-CM

## 2023-11-29 DIAGNOSIS — M43.6 NECK STIFFNESS: ICD-10-CM

## 2023-11-29 DIAGNOSIS — I89.0 LYMPHEDEMA: Primary | ICD-10-CM

## 2023-11-29 PROCEDURE — 97110 THERAPEUTIC EXERCISES: CPT | Mod: GO

## 2023-11-29 PROCEDURE — 97140 MANUAL THERAPY 1/> REGIONS: CPT | Mod: GO

## 2023-11-29 NOTE — PROGRESS NOTES
"Occupational Therapy    Occupational Therapy Treatment    Name: Miroslava Roman  MRN: [id number]  : 1965  Date: 23  #49   Approved number of visits 60    Assessment:  Decreased face, neck fullness after tx,  Abdomen softer per pt, \"still full\".    Plan:  Frequency and duration:. Continue occupational therapy for modified trunk, abdominal, head and neck, UE lymphedema complete decongestive therapy (CDT), exercises, stretches for decongestion and mobility, pain management, increase optimal function; 1x for every other week. Increase frequency as needed. Modify, upgrade tx/strategies due to pt fluctuating swelling and pain levels. Support voice function.       Subjective   Pt reports her legs feel better \"back to normal\" after OT tx's. \"Still need work\".  Pt reports abdomen full, eyes swollen, head and neck is full.     Pain Assessment:  Pt reports h/o right lower back pain, reports no pain currently.     Objective    Therapy Diagnosis  1. Lymphedema        2. Lymphedema syndrome, postmastectomy        3. Neck stiffness        4. Lymphedema of face            Objective       Head and neck edema located in the following area(s): .   Face: face (bilaterally) midface nose upper lip   Cheek:full bilaterally  Jowl:/Jaw line: full bilaterally   Lower Eyelid: puffy bilaterally      Additional Information: Full abdomen, firm  Face and neck full. Eyes full.   Post neck tight.  Bilateral axilla full.  L forearm full per pt.          Time in 1535  Time out 1630  Total time 55 minutes    Treatment        Manual Therapy 40   OT provides deep and superficial abdominal MLD, trunk, neck, head/face MLD.  OT provide neck, shoulder/trap soft tissue stretches with MLD.    Pt note a decrease in fullness in face and neck with tx.  OT provide MLD left forearm. Pt reports less tightness with tx.  Pt follow MLD daily at home.     Therapeutic exercise 15  Neck ROM, various planes, intermittent with MLD.  Supine, knees to chest, " rotation  Side bends x5 each side  Hip flexor stretch 1x10 hold each side

## 2023-12-07 DIAGNOSIS — J45.20 MILD INTERMITTENT ASTHMA WITHOUT COMPLICATION (HHS-HCC): Primary | ICD-10-CM

## 2023-12-07 RX ORDER — ALBUTEROL SULFATE 90 UG/1
1-2 AEROSOL, METERED RESPIRATORY (INHALATION) AS NEEDED
Qty: 18 G | Refills: 3 | Status: SHIPPED | OUTPATIENT
Start: 2023-12-07 | End: 2024-03-08

## 2023-12-11 ENCOUNTER — TELEPHONE (OUTPATIENT)
Dept: PRIMARY CARE | Facility: CLINIC | Age: 58
End: 2023-12-11
Payer: COMMERCIAL

## 2023-12-11 DIAGNOSIS — R05.3 CHRONIC COUGH: Primary | ICD-10-CM

## 2023-12-11 RX ORDER — BENZONATATE 100 MG/1
100 CAPSULE ORAL 3 TIMES DAILY PRN
Qty: 42 CAPSULE | Refills: 0 | Status: SHIPPED | OUTPATIENT
Start: 2023-12-11 | End: 2023-12-21 | Stop reason: SDUPTHER

## 2023-12-11 NOTE — TELEPHONE ENCOUNTER
Asking for Benzonatate pills to be called in for cough stated she called last week        [phone] patient asking for a call back

## 2023-12-13 ENCOUNTER — TREATMENT (OUTPATIENT)
Dept: OCCUPATIONAL THERAPY | Facility: CLINIC | Age: 58
End: 2023-12-13
Payer: COMMERCIAL

## 2023-12-13 DIAGNOSIS — I89.0 LYMPHEDEMA: Primary | ICD-10-CM

## 2023-12-13 DIAGNOSIS — I97.2 LYMPHEDEMA SYNDROME, POSTMASTECTOMY: ICD-10-CM

## 2023-12-13 DIAGNOSIS — M43.6 NECK STIFFNESS: ICD-10-CM

## 2023-12-13 DIAGNOSIS — I89.0 LYMPHEDEMA OF FACE: ICD-10-CM

## 2023-12-13 DIAGNOSIS — R29.898 WEAKNESS OF LEFT ARM: ICD-10-CM

## 2023-12-13 PROCEDURE — 97140 MANUAL THERAPY 1/> REGIONS: CPT | Mod: GO | Performed by: OCCUPATIONAL THERAPIST

## 2023-12-13 NOTE — PROGRESS NOTES
Occupational Therapy    Occupational Therapy Treatment    Name: Miroslava Roman  MRN: [id number]  : 1965  Date: 23  #50  Approved number of visits 60    Assessment:  Patient with noted decreased fullness in head and neck post MLD treatment; pt expressed improvement in symptoms post MLD.     Plan:  Frequency and duration:. Continue occupational therapy for modified trunk, abdominal, head and neck, UE lymphedema complete decongestive therapy (CDT), exercises, stretches for decongestion and mobility, pain management, increase optimal function; 1x for every other week. Increase frequency as needed. Modify, upgrade tx/strategies due to pt fluctuating swelling and pain levels. Support voice function.       Subjective   Pt reports she got sick last week. Patient reports she is recently starting to feel better.     Pain Assessment:  Pt reports h/o right lower back pain, reports no pain currently.     Objective    Therapy Diagnosis  1. Lymphedema        2. Lymphedema syndrome, postmastectomy        3. Neck stiffness        4. Lymphedema of face        5. Weakness of left arm            Objective       Head and neck edema located in the following area(s): .   Face: face (bilaterally) midface nose upper lip   Cheek:full bilaterally  Jowl:/Jaw line: full bilaterally   Lower Eyelid: puffy bilaterally      Additional Information: Full abdomen, firm  Face and neck full. Eyes full.   Post neck tight.  Bilateral axilla full.  L forearm full per pt.          Time in 1504  Time out 1558  Total time 54 minutes    Treatment        Manual Therapy 40       OT provided Manual Lymph Drainage to abdomen (paired with diaphragmatic breathing), head, and neck (anterior and posterior)       -pt with noted decreased fullness post treatment              full range of motion in all extremities

## 2023-12-21 ENCOUNTER — OFFICE VISIT (OUTPATIENT)
Dept: PRIMARY CARE | Facility: CLINIC | Age: 58
End: 2023-12-21
Payer: COMMERCIAL

## 2023-12-21 VITALS
DIASTOLIC BLOOD PRESSURE: 88 MMHG | TEMPERATURE: 97.5 F | HEART RATE: 99 BPM | OXYGEN SATURATION: 99 % | SYSTOLIC BLOOD PRESSURE: 112 MMHG | RESPIRATION RATE: 16 BRPM | WEIGHT: 171 LBS | BODY MASS INDEX: 27.6 KG/M2

## 2023-12-21 DIAGNOSIS — R05.3 CHRONIC COUGH: ICD-10-CM

## 2023-12-21 DIAGNOSIS — R22.1 SWOLLEN NECK: ICD-10-CM

## 2023-12-21 DIAGNOSIS — R13.10 DYSPHAGIA, UNSPECIFIED TYPE: ICD-10-CM

## 2023-12-21 DIAGNOSIS — I89.0 LYMPHEDEMA: ICD-10-CM

## 2023-12-21 DIAGNOSIS — J45.20 MILD INTERMITTENT ASTHMA WITHOUT COMPLICATION (HHS-HCC): Primary | ICD-10-CM

## 2023-12-21 PROBLEM — D22.5 MELANOCYTIC NEVI OF TRUNK: Status: ACTIVE | Noted: 2023-02-24
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
## 2023-12-21 PROBLEM — S52.502A DISTAL RADIUS FRACTURE, LEFT: Status: ACTIVE | Noted: 2023-12-21

## 2023-12-21 PROBLEM — L72.3 SEBACEOUS CYST: Status: ACTIVE | Noted: 2023-02-24

## 2023-12-21 PROBLEM — L81.4 OTHER MELANIN HYPERPIGMENTATION: Status: ACTIVE | Noted: 2023-02-24

## 2023-12-21 PROBLEM — D22.60 MELANOCYTIC NEVI OF UNSPECIFIED UPPER LIMB, INCLUDING SHOULDER: Status: ACTIVE | Noted: 2023-02-24

## 2023-12-21 PROBLEM — M19.049 LOCALIZED, PRIMARY OSTEOARTHRITIS OF HAND: Status: ACTIVE | Noted: 2022-05-19

## 2023-12-21 PROBLEM — D22.39 MELANOCYTIC NEVI OF OTHER PARTS OF FACE: Status: ACTIVE | Noted: 2023-02-24

## 2023-12-21 PROBLEM — S62.109A WRIST FRACTURE: Status: ACTIVE | Noted: 2023-12-21

## 2023-12-21 PROBLEM — H02.884: Status: ACTIVE | Noted: 2019-06-07

## 2023-12-21 PROBLEM — L82.1 OTHER SEBORRHEIC KERATOSIS: Status: ACTIVE | Noted: 2023-02-24

## 2023-12-21 PROBLEM — D22.70 MELANOCYTIC NEVI OF UNSPECIFIED LOWER LIMB, INCLUDING HIP: Status: ACTIVE | Noted: 2023-02-24

## 2023-12-21 PROBLEM — H02.885 MEIBOMIAN GLAND DYSFUNCTION (MGD) OF LEFT LOWER EYELID: Status: ACTIVE | Noted: 2019-06-07

## 2023-12-21 PROBLEM — S52.613A FRACTURE OF ULNAR STYLOID: Status: ACTIVE | Noted: 2023-12-21

## 2023-12-21 PROCEDURE — 1036F TOBACCO NON-USER: CPT | Performed by: INTERNAL MEDICINE

## 2023-12-21 PROCEDURE — 99214 OFFICE O/P EST MOD 30 MIN: CPT | Performed by: INTERNAL MEDICINE

## 2023-12-21 RX ORDER — BENZONATATE 100 MG/1
100 CAPSULE ORAL 3 TIMES DAILY PRN
Qty: 42 CAPSULE | Refills: 2 | Status: SHIPPED | OUTPATIENT
Start: 2023-12-21 | End: 2024-01-20

## 2023-12-21 RX ORDER — MONTELUKAST SODIUM 10 MG/1
10 TABLET ORAL NIGHTLY
Qty: 30 TABLET | Refills: 11 | Status: SHIPPED | OUTPATIENT
Start: 2023-12-21 | End: 2024-02-06 | Stop reason: SINTOL

## 2023-12-21 ASSESSMENT — PATIENT HEALTH QUESTIONNAIRE - PHQ9
SUM OF ALL RESPONSES TO PHQ9 QUESTIONS 1 AND 2: 0
2. FEELING DOWN, DEPRESSED OR HOPELESS: NOT AT ALL
1. LITTLE INTEREST OR PLEASURE IN DOING THINGS: NOT AT ALL

## 2023-12-21 NOTE — PROGRESS NOTES
Subjective   Patient ID: Miroslava Roman is a 57 y.o. female who presents for Edema (Swelling in her throat, it was hard for her to swallow.).    HPI     She was sick and had a nasty cough and all resolved. She states could not yawn and had pain. She still has a little cough left. She states when she would yawn she had severe pain in her neck. This went away. Her lymphedema is always there but worsened    She states not doing pulmicort/ICS as she had fracture and believes it to have been from inhaled steroids as a strong family hx of this  Has been [facility] massage therapy  Doing lymphedema therapy daily    Has issues with swallowing liquid if hits the back of her throat too hard. States never solids. Hard to explain      Review of Systems   All other systems reviewed and are negative.      Objective   /88   Pulse 99   Temp 36.4 °C (97.5 °F)   Resp 16   Wt 77.6 kg (171 lb)   SpO2 99%   BMI 27.60 kg/m²     Physical Exam  Constitutional:       Appearance: Normal appearance.   Neck:      Comments: Lymphedema present in neck. No goiter noted.  Cardiovascular:      Rate and Rhythm: Normal rate and regular rhythm.      Heart sounds: Normal heart sounds. No murmur heard.     No gallop.   Pulmonary:      Effort: Pulmonary effort is normal. No respiratory distress.      Breath sounds: Normal breath sounds.   Musculoskeletal:      Right lower leg: No edema.      Left lower leg: No edema.   Neurological:      Mental Status: She is alert.         Assessment/Plan   Problem List Items Addressed This Visit             ICD-10-CM    Asthma - Primary J45.909    Relevant Medications    montelukast (Singulair) 10 mg tablet    Cough R05.9    Relevant Medications    benzonatate (Tessalon) 100 mg capsule    Lymphedema I89.0    Relevant Orders    CT soft tissue neck wo IV contrast     Other Visit Diagnoses         Codes    Swollen neck     R22.1    Relevant Orders    CT soft tissue neck wo IV contrast    Dysphagia, unspecified type      R13.10    Relevant Orders    CT soft tissue neck wo IV contrast        Neck swelling  -check CT neck to ensure nothing else going on  -discussed barium and/EGD. Declines EGD but may consider barium is worsens  -start singulair for asthma to get better control as lepe snot want ICS  -tessalon perles refilled  -lungs are clear

## 2023-12-27 ENCOUNTER — TREATMENT (OUTPATIENT)
Dept: OCCUPATIONAL THERAPY | Facility: CLINIC | Age: 58
End: 2023-12-27
Payer: COMMERCIAL

## 2023-12-27 DIAGNOSIS — I89.0 LYMPHEDEMA: Primary | ICD-10-CM

## 2023-12-27 DIAGNOSIS — I89.0 LYMPHEDEMA OF FACE: ICD-10-CM

## 2023-12-27 DIAGNOSIS — M43.6 NECK STIFFNESS: ICD-10-CM

## 2023-12-27 DIAGNOSIS — I97.2 LYMPHEDEMA SYNDROME, POSTMASTECTOMY: ICD-10-CM

## 2023-12-27 PROCEDURE — 97140 MANUAL THERAPY 1/> REGIONS: CPT | Mod: GO

## 2023-12-27 PROCEDURE — 97110 THERAPEUTIC EXERCISES: CPT | Mod: GO

## 2023-12-27 NOTE — PROGRESS NOTES
Occupational Therapy    Occupational Therapy Treatment    Name: Miroslava Roman  MRN: [id number]  : 1965  Date: 2023  Visit #51    Time Calculation  Start Time: 1530  Stop Time: 1625  Time Calculation (min): 55 min      Assessment:  Patient with noted decreased fullness in head and neck post MLD treatment; decreased tightness abdomen post tx.  Pt expressed improvement in symptoms post MLD.   Voice quality more clear.      Plan:  Frequency and duration:. Continue occupational therapy for modified trunk, abdominal, head and neck, UE lymphedema complete decongestive therapy (CDT), exercises, stretches for decongestion and mobility, pain management, increase optimal function; 1x for every other week. Increase frequency as needed. Modify, upgrade tx/strategies due to pt fluctuating swelling and pain levels. Support voice function.////       Subjective   Pt reports she got sick, is feeling better.   Pt reports swollen eye lids, abdomen, left wrist.    Pain Assessment:  Pt reports discomfort/pain 3/10 thighs, hips, abdomen.     Objective    Therapy Diagnosis  1. Lymphedema        2. Lymphedema syndrome, postmastectomy        3. Neck stiffness        4. Lymphedema of face            Objective       Head and neck edema located in the following area(s): .   Face: face (bilaterally) midface nose upper lip   Cheek:full bilaterally  Jowl:/Jaw line: full bilaterally   Lower Eyelid: puffy bilaterally      Additional Information: Full abdomen, firm  Face and neck full. Eyes full.   Post neck tight.  Bilateral axilla full.  L forearm full per pt.            Treatment  55 minutes    Manual Therapy  40       OT provided Manual Lymph Drainage to abdomen (paired with diaphragmatic breathing) both deep and superficial, MLD trunk, neck, face (anterior and posterior head and neck drainage patterns). Gentle soft tissue techniques neck, face with MLD.  Intermittent stretches with MLD, repeat abdominal MLD      -pt with noted  decreased fullness post treatment     There Ex  15  Neck ROM  Bridges x5  Supine hip flex, rotation x10 each side  Neck ROM  Mouth stretches

## 2024-01-02 ENCOUNTER — TELEPHONE (OUTPATIENT)
Dept: PRIMARY CARE | Facility: CLINIC | Age: 59
End: 2024-01-02
Payer: COMMERCIAL

## 2024-01-02 NOTE — TELEPHONE ENCOUNTER
Pt is calling because since we seen her last she had COVID.    She seems to be over all of the symptoms but some SOB.    She is having trouble keeping her oxygern up, she can make it go up but it does not stay up.    At first it was just annoying but now sometimes she gets dizzy.    Please advise.

## 2024-01-04 ENCOUNTER — APPOINTMENT (OUTPATIENT)
Dept: OCCUPATIONAL THERAPY | Facility: CLINIC | Age: 59
End: 2024-01-04
Payer: COMMERCIAL

## 2024-01-08 NOTE — PROGRESS NOTES
Pulmonary Consult    Request of Pulmonary Consult by PCP Maryellen White to evaluate Miroslava Roman is a 58 y.o. year old female for asthma.   I have independently interviewed and examined the patient in the office and reviewed available records.    Physician HPI (2024):  A 58-year-old lady with past medical history of lymphedema,  breast cancer stage I status post resection asthma diagnosed since she was 20 years old  She is maintained on albuterol as needed and Flovent 110 micrograms not on regular daily use.  She states during the last 2 months she has been using her albuterol more frequently she had upper respiratory tract infection most likely viral infection followed by prolonged cough and chest wheezes, she has residual shortness of breath with exercise.  Currently no night symptoms.  She is allergic to grass pollen, has attacks of asthma and early spring, she was never tested for allergy  She denies any chest pain or hemoptysis, no fever  She denies any GI symptoms  She was diagnosed with lymphedema more than 10 years ago undergoing massage therapy daily has recently has her neck swollen seen by her primary care physician who ordered for care CT of the neck    Social history  She is a never smoker Works as a [employer] organization for 20 years  She lives in an old house but no hemoptysis  She has 9 kids 2 grandkids    Past medical history:  Breast cancer  Lymphedema  Bronchial asthma    Family history :  autoimmune diseases thyroid eczema and heart disease, diabetes mellitus bronchial asthma past surgical history    Surgical history:    Breast cancer resection and reconstruction    Immunization History:  Immunization History   Administered Date(s) Administered    Tdap vaccine, age 7 year and older (BOOSTRIX) 2011       Family History:  Family History   Problem Relation Name Age of Onset    Arthritis Mother      Other (CREST syndrome) Mother      Diabetes Mother      Heart disease  Mother      Hypertension Mother      Multiple myeloma Mother      Arthritis Son      Kidney cancer Son      Breast cancer Mother's Sister      Other (celiac crisis) Mother's Sister      Lung cancer Mother's Brother      Pancreatic cancer Maternal Grandmother      Breast cancer Other      Ovarian cancer Other         Social History:  Social History     Socioeconomic History    Marital status:      Spouse name: None    Number of children: None    Years of education: None    Highest education level: None   Occupational History    None   Tobacco Use    Smoking status: Never     Passive exposure: Never    Smokeless tobacco: Never   Substance and Sexual Activity    Alcohol use: Yes     Alcohol/week: 4.0 standard drinks of alcohol     Types: 4 Glasses of wine per week     Comment: weekly    Drug use: Never    Sexual activity: None   Other Topics Concern    None   Social History Narrative    None     Social Determinants of Health     Financial Resource Strain: Not on file   Food Insecurity: Not on file   Transportation Needs: Not on file   Physical Activity: Not on file   Stress: Not on file   Social Connections: Not on file   Intimate Partner Violence: Not on file   Housing Stability: Not on file       Current Medications:  Current Outpatient Medications   Medication Instructions    albuterol 90 mcg/actuation inhaler 1-2 puffs, inhalation, As needed, Every 4 to 6 hours    benzonatate (TESSALON) 100 mg, oral, 3 times daily PRN, Do not crush or chew.    budesonide (Pulmicort Flexhaler) 90 mcg/actuation inhaler 1 puff, inhalation, 2 times daily RT, Rinse mouth with water after use to reduce aftertaste and incidence of candidiasis. Do not swallow.    cholecalciferol (Vitamin D-3) 50 MCG (2000 UT) tablet Vitamin D 50 MCG (2000 UT) Oral Tablet   Refills: 0        Start : 8-Jul-2015   Active    fluticasone (Flonase) 50 mcg/actuation nasal spray 1-2 sprays, Each Nostril, Daily, Shake gently. Before first use, prime pump.  "After use, clean tip and replace cap.    fluticasone (Flovent HFA) 110 mcg/actuation inhaler 2 puffs, inhalation, Daily, Rinse mouth with water after use to reduce aftertaste and incidence of candidiasis. Do not swallow.    fluticasone (Flovent HFA) 110 mcg/actuation inhaler 1 puff, inhalation, 2 times daily RT, Rinse mouth with water after use to reduce aftertaste and incidence of candidiasis. Do not swallow.    methylPREDNISolone (Medrol Dospak) 4 mg tablets Take as directed on package.    montelukast (SINGULAIR) 10 mg, oral, Nightly    VITAMIN B COMPLEX ORAL B Complex TABS   Refills: 0       Active        Drug Allergies/Intolerances:  Allergies   Allergen Reactions    Bee Pollen Unknown    Iodinated Contrast Media Other     Nausea, vomiting    Pollen Extracts Itching    Propoxyphene Nausea Only    Singulair [Montelukast] Dizziness        Review of Systems:  All other review of systems are negative.    Physical Examination:  /83   Pulse 95   Temp 36.4 °C (97.6 °F) (Temporal)   Resp 18   Ht 1.676 m (5' 6\")   Wt 77.4 kg (170 lb 9.6 oz)   SpO2 95%   BMI 27.54 kg/m²      General: ambulated independently; no acute distress; well-nourished; work of breathing was not increased; normal vocal character  HEENT: normocephalic; anicteric sclerae; conjunctivae not injected; nasal mucosa was unremarkable; oropharynx was clear without evidence of thrush; dentition was good.  Neck: Lymphedema swelling present in neck and supraclavicular area. No goiter noted.   Chest: clear to auscultation bilaterally; no chest wall deformity.  Cardiac: regular rhythm; no gallop or murmur.  Abdomen: soft; non-tender; non-distended; no hepatosplenomegaly.  Extremities: no leg edema; no digital clubbing; 2+ pulses  Psychiatric: did not appear depressed or anxious.    Pulmonary Function Test Results      Media Information      Document Information     Media Information              Chest Radiograph     XR chest 2 view " 11/14/2022    Narrative  MRN: [id number]  Patient Name: NIDIA JACOBSEN    STUDY:  TH CHEST 2 VIEW PA AND LAT;  11/14/2022 9:15 am    INDICATION:  cough  J20.9: Acute bronchitis.    COMPARISON:  03/24/2020    ACCESSION NUMBER(S):  [id number]    ORDERING CLINICIAN:  GENEVIEVE WOLF    FINDINGS:      CARDIOMEDIASTINAL SILHOUETTE:  Cardiomediastinal silhouette is normal in size and configuration.    LUNGS:  Lungs are clear.    ABDOMEN:  No remarkable upper abdominal findings.    BONES:  No acute osseous changes.    Impression  1.  No evidence of acute cardiopulmonary process.    Assessment and Plan / Recommendations:  Problem List Items Addressed This Visit       Asthma - Primary    Relevant Medications    fluticasone (Flovent HFA) 110 mcg/actuation inhaler    Other Relevant Orders    Respiratory Allergy Profile IgE    IgE    CBC and Auto Differential    Complete Pulmonary Function Test Pre/Post Bronchodialator (Spirometry Pre/Post/DLCO/Lung Volumes)     -Bronchial asthma   Suboptimally controlled   Diagnosed since she was 20 years old  Maintained on albuterol as needed and Flovent not on regular basis  Recent exacerbation in December.  Had PFT done before bronchodilator was not done  Recommendation   complete PFT with post bronchodilator  Flovent twice daily daily  IgE level and respiratory allergy panel  Singulair 10 mg daily    -Lymphedema:  Diagnosed 10 years ago  On lymphedema massage therapy daily   scheduled for CT neck    -Breast cancer:  Status post resection  Follow-up in 4-5 month

## 2024-01-09 ENCOUNTER — TELEPHONE (OUTPATIENT)
Dept: PRIMARY CARE | Facility: CLINIC | Age: 59
End: 2024-01-09
Payer: COMMERCIAL

## 2024-01-09 NOTE — TELEPHONE ENCOUNTER
----- Message from Maryellen Khan MD sent at 1/9/2024  2:23 PM EST -----  Will you let her know insurance wants her to do CT neck with dye? Is she willing to do this? We could pretreat to help her symptoms. I have nausea and vomiting. Can we confirm this?

## 2024-01-10 NOTE — TELEPHONE ENCOUNTER
Pt called back and states that one arm is breast cancer arm with lymph modes removed and the other arm is the one that she broke in May and it is still swollen on a regular basis with lymphedema.    She does not think that she is interested in doing the IV with the test because as soon as they poke her lymphatic swollen arm it will swell up more and the risk of infection goes up more and the fact that the swelling probably wont go down for months.    She is not sure if it is worth the repercussions of having a IV put in.    She might have to appeal this with her insurance because they are probably not aware of the above.    Please advise.

## 2024-01-11 ENCOUNTER — TELEPHONE (OUTPATIENT)
Dept: PRIMARY CARE | Facility: CLINIC | Age: 59
End: 2024-01-11
Payer: COMMERCIAL

## 2024-01-11 DIAGNOSIS — J45.40 MODERATE PERSISTENT ASTHMA WITHOUT COMPLICATION (HHS-HCC): Primary | ICD-10-CM

## 2024-01-11 RX ORDER — METHYLPREDNISOLONE 4 MG/1
TABLET ORAL
Qty: 21 TABLET | Refills: 0 | Status: SHIPPED | OUTPATIENT
Start: 2024-01-11 | End: 2024-01-18

## 2024-01-11 NOTE — TELEPHONE ENCOUNTER
Pt has a appt with pulm soon, but she is still coughing, dry cough.    She is using the inhaled steroid one puff two times a day, still using cough pills and nebulizer 4 times or so a day.     Is there anything else she can do until the pulm appt or she will just have to wait until she sees them?    Please advise.

## 2024-01-11 NOTE — TELEPHONE ENCOUNTER
The CT neck without contrast is approved but her insurance is denying the location. They are going to contact Miroslava with locations where it is approved. They will have this info in next 24-48 hours.

## 2024-01-15 ENCOUNTER — TREATMENT (OUTPATIENT)
Dept: OCCUPATIONAL THERAPY | Facility: CLINIC | Age: 59
End: 2024-01-15
Payer: COMMERCIAL

## 2024-01-15 DIAGNOSIS — I89.0 LYMPHEDEMA: Primary | ICD-10-CM

## 2024-01-15 DIAGNOSIS — I89.0 LYMPHEDEMA OF FACE: ICD-10-CM

## 2024-01-15 DIAGNOSIS — M62.81 GENERALIZED MUSCLE WEAKNESS: ICD-10-CM

## 2024-01-15 PROCEDURE — 97140 MANUAL THERAPY 1/> REGIONS: CPT | Mod: GO,CO

## 2024-01-15 NOTE — PROGRESS NOTES
Occupational Therapy    Occupational Therapy Treatment    Name: Miroslava Roman  MRN: [id number]  : 1965  Date: 1/15/2023  Visit #1 of 60 in     Time Calculation  Start Time: 1508  Stop Time: 1606  Time Calculation (min): 58 min      Assessment:  Pt appears to tolerate treatment well with no complaints of pain. Good tissue texture softening post MLD. Improved ability to open eyes fully.       Plan:  Frequency and duration:. Continue occupational therapy for modified trunk, abdominal, head and neck, UE lymphedema complete decongestive therapy (CDT), exercises, stretches for decongestion and mobility, pain management, increase optimal function; 1x for every other week. Increase frequency as needed. Modify, upgrade tx/strategies due to pt fluctuating swelling and pain levels. Support voice function.////       Subjective   Pt reports her left arm, abdomen and face feel full. Pt states she feels she has been having trouble opening her eyes all of the way.   Pain Assessment:       Objective    Therapy Diagnosis  1. Lymphedema        2. Lymphedema of face        3. Generalized muscle weakness                Objective       Head and neck edema located in the following area(s): .   Face: face (bilaterally) midface nose upper lip   Cheek:full bilaterally  Jowl:/Jaw line: full bilaterally   Lower Eyelid: puffy bilaterally      Additional Information: Full abdomen, firm  Face and neck full. Eyes full.   Post neck tight.  Bilateral axilla full.  L forearm full per pt.            Treatment  58 minutes    Manual Therapy  58       GARCIA provided Manual Lymph Drainage to abdomen (paired with diaphragmatic breathing) both deep and superficial, MLD trunk, neck, face (anterior and posterior head and neck drainage patterns). Gentle soft tissue techniques neck, face with MLD.

## 2024-01-16 ENCOUNTER — APPOINTMENT (OUTPATIENT)
Dept: RADIOLOGY | Facility: CLINIC | Age: 59
End: 2024-01-16
Payer: COMMERCIAL

## 2024-01-17 ENCOUNTER — OFFICE VISIT (OUTPATIENT)
Dept: PULMONOLOGY | Facility: CLINIC | Age: 59
End: 2024-01-17
Payer: COMMERCIAL

## 2024-01-17 VITALS
RESPIRATION RATE: 18 BRPM | HEIGHT: 66 IN | BODY MASS INDEX: 27.42 KG/M2 | HEART RATE: 95 BPM | WEIGHT: 170.6 LBS | DIASTOLIC BLOOD PRESSURE: 83 MMHG | TEMPERATURE: 97.6 F | SYSTOLIC BLOOD PRESSURE: 125 MMHG | OXYGEN SATURATION: 95 %

## 2024-01-17 DIAGNOSIS — J45.909 MODERATE ASTHMA, UNSPECIFIED WHETHER COMPLICATED, UNSPECIFIED WHETHER PERSISTENT (HHS-HCC): Primary | ICD-10-CM

## 2024-01-17 PROCEDURE — 1036F TOBACCO NON-USER: CPT | Performed by: INTERNAL MEDICINE

## 2024-01-17 PROCEDURE — 99204 OFFICE O/P NEW MOD 45 MIN: CPT | Performed by: INTERNAL MEDICINE

## 2024-01-17 RX ORDER — FLUTICASONE PROPIONATE 110 UG/1
2 AEROSOL, METERED RESPIRATORY (INHALATION) DAILY
COMMUNITY
End: 2024-02-06 | Stop reason: ALTCHOICE

## 2024-01-17 RX ORDER — FLUTICASONE PROPIONATE 110 UG/1
1 AEROSOL, METERED RESPIRATORY (INHALATION)
Qty: 12 G | Refills: 11 | Status: SHIPPED | OUTPATIENT
Start: 2024-01-17 | End: 2024-02-06 | Stop reason: SDUPTHER

## 2024-01-29 ENCOUNTER — TREATMENT (OUTPATIENT)
Dept: OCCUPATIONAL THERAPY | Facility: CLINIC | Age: 59
End: 2024-01-29
Payer: COMMERCIAL

## 2024-01-29 DIAGNOSIS — M43.6 NECK STIFFNESS: ICD-10-CM

## 2024-01-29 DIAGNOSIS — R29.898 WEAKNESS OF LEFT ARM: ICD-10-CM

## 2024-01-29 DIAGNOSIS — I89.0 LYMPHEDEMA OF FACE: ICD-10-CM

## 2024-01-29 DIAGNOSIS — I97.2 LYMPHEDEMA SYNDROME, POSTMASTECTOMY: ICD-10-CM

## 2024-01-29 DIAGNOSIS — I89.0 LYMPHEDEMA: Primary | ICD-10-CM

## 2024-01-29 PROCEDURE — 97140 MANUAL THERAPY 1/> REGIONS: CPT | Mod: GO | Performed by: OCCUPATIONAL THERAPIST

## 2024-01-29 NOTE — PROGRESS NOTES
Occupational Therapy    Occupational Therapy Treatment    Name: Miroslava Roman  MRN: [id number]  : 1965  Date: 1/15/2023  Visit #2 of 60 in            Assessment:  Pt with noted decreased fullness in abdomen, trunk and head/neck post Manual Lymph Drainage.  Patient expressed decreased back pain post treatment       Plan:  Frequency and duration:. Continue occupational therapy for modified trunk, abdominal, head and neck, UE lymphedema complete decongestive therapy (CDT), exercises, stretches for decongestion and mobility, pain management, increase optimal function; 1x for every other week. Increase frequency as needed. Modify, upgrade tx/strategies due to pt fluctuating swelling and pain levels. Support voice function.////       Subjective   Patient reports her abdomen feels more full and expressed pain in back when twisting to side.    Pain Assessment:       Objective    Therapy Diagnosis  1. Lymphedema        2. Lymphedema of face        3. Lymphedema syndrome, postmastectomy        4. Neck stiffness        5. Weakness of left arm                  Objective       Head and neck edema located in the following area(s): .   Face: face (bilaterally) midface nose upper lip   Cheek:full bilaterally  Jowl:/Jaw line: full bilaterally   Lower Eyelid: puffy bilaterally      Additional Information: Full abdomen, firm  Face and neck full. Eyes full.   Post neck tight.  Bilateral axilla full.  L forearm full per pt.            Treatment  56 minutes    Manual Therapy  56   OT provided Manual Lymph Drainage to abdomen (paired with diaphragmatic breathing) both deep and superficial, B trunk, back, neck (anterior and posterior), and face   -softened tissue texture post treatment

## 2024-02-06 ENCOUNTER — OFFICE VISIT (OUTPATIENT)
Dept: PRIMARY CARE | Facility: CLINIC | Age: 59
End: 2024-02-06
Payer: COMMERCIAL

## 2024-02-06 VITALS
SYSTOLIC BLOOD PRESSURE: 132 MMHG | OXYGEN SATURATION: 98 % | HEART RATE: 89 BPM | TEMPERATURE: 97.5 F | DIASTOLIC BLOOD PRESSURE: 98 MMHG | BODY MASS INDEX: 28.36 KG/M2 | RESPIRATION RATE: 16 BRPM | WEIGHT: 170.2 LBS | HEIGHT: 65 IN

## 2024-02-06 DIAGNOSIS — J45.909 MODERATE ASTHMA, UNSPECIFIED WHETHER COMPLICATED, UNSPECIFIED WHETHER PERSISTENT (HHS-HCC): ICD-10-CM

## 2024-02-06 DIAGNOSIS — Z00.00 HEALTHCARE MAINTENANCE: Primary | ICD-10-CM

## 2024-02-06 DIAGNOSIS — I97.2 LYMPHEDEMA SYNDROME, POSTMASTECTOMY: ICD-10-CM

## 2024-02-06 DIAGNOSIS — R73.09 ELEVATED GLUCOSE: ICD-10-CM

## 2024-02-06 PROBLEM — J02.9 SORE THROAT: Status: RESOLVED | Noted: 2023-03-28 | Resolved: 2024-02-06

## 2024-02-06 PROBLEM — R05.8 POST-VIRAL COUGH SYNDROME: Status: RESOLVED | Noted: 2023-03-27 | Resolved: 2024-02-06

## 2024-02-06 PROBLEM — J32.9 CHRONIC SINUSITIS: Status: ACTIVE | Noted: 2024-02-06

## 2024-02-06 PROBLEM — B30.9 VIRAL CONJUNCTIVITIS OF BOTH EYES: Status: RESOLVED | Noted: 2023-03-28 | Resolved: 2024-02-06

## 2024-02-06 PROBLEM — Z86.19 HISTORY OF INFECTIOUS DISEASE: Status: ACTIVE | Noted: 2024-02-06

## 2024-02-06 PROBLEM — R13.10 DYSPHAGIA: Status: ACTIVE | Noted: 2024-02-06

## 2024-02-06 PROBLEM — Z86.19 HISTORY OF INFECTIOUS DISEASE: Status: RESOLVED | Noted: 2024-02-06 | Resolved: 2024-02-06

## 2024-02-06 PROBLEM — J20.9 ACUTE BRONCHITIS: Status: RESOLVED | Noted: 2022-11-14 | Resolved: 2024-02-06
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
## 2024-02-06 PROBLEM — M25.539 PAIN IN WRIST: Status: ACTIVE | Noted: 2024-02-06

## 2024-02-06 PROBLEM — Z85.3 HISTORY OF MALIGNANT NEOPLASM OF BREAST: Status: ACTIVE | Noted: 2023-05-12

## 2024-02-06 PROBLEM — J20.9 ACUTE BRONCHITIS: Status: ACTIVE | Noted: 2022-11-14

## 2024-02-06 PROCEDURE — 1036F TOBACCO NON-USER: CPT | Performed by: INTERNAL MEDICINE

## 2024-02-06 PROCEDURE — 99396 PREV VISIT EST AGE 40-64: CPT | Performed by: INTERNAL MEDICINE

## 2024-02-06 RX ORDER — FLUTICASONE PROPIONATE 110 UG/1
1 AEROSOL, METERED RESPIRATORY (INHALATION)
Qty: 12 G | Refills: 11 | Status: SHIPPED | OUTPATIENT
Start: 2024-02-06 | End: 2025-02-05

## 2024-02-06 ASSESSMENT — ENCOUNTER SYMPTOMS
DIZZINESS: 0
POLYPHAGIA: 0
CHILLS: 0
PALPITATIONS: 0
SORE THROAT: 0
COUGH: 1
DIARRHEA: 0
DYSURIA: 0
CONSTIPATION: 0
SHORTNESS OF BREATH: 0
UNEXPECTED WEIGHT CHANGE: 0
CHEST TIGHTNESS: 0
HEADACHES: 0
WHEEZING: 0
WOUND: 0
MYALGIAS: 0
VOMITING: 0
DYSPHORIC MOOD: 0
POLYDIPSIA: 0
FEVER: 0
HEMATURIA: 0
NAUSEA: 0
FREQUENCY: 0
RHINORRHEA: 0
NERVOUS/ANXIOUS: 0
EYE PAIN: 0
BLOOD IN STOOL: 0
ABDOMINAL PAIN: 0
ARTHRALGIAS: 0

## 2024-02-06 ASSESSMENT — PATIENT HEALTH QUESTIONNAIRE - PHQ9
2. FEELING DOWN, DEPRESSED OR HOPELESS: NOT AT ALL
1. LITTLE INTEREST OR PLEASURE IN DOING THINGS: NOT AT ALL
SUM OF ALL RESPONSES TO PHQ9 QUESTIONS 1 AND 2: 0

## 2024-02-06 NOTE — PROGRESS NOTES
"Subjective   Patient ID: Miroslava Roman is a 58 y.o. female who presents for Annual Exam and Gynecologic Exam.    HPI     Dental visits- UTD  Eye visits- to schedule    Exercise-does lymphatic massage, none currently but working on it with asthma flare  Diet-healthy but likes snacks (chips)    Alcohol use-1-2x weekly  Smoking-none    On flovent  Will do CT neck but has to schedule  Saw pulmonary  Wants flovent refilled    She also states she does not want a pap. States she would not want to do anything if abnormal  No cancer screening at this time. She sttaes she understands the risk and does not want to do any at this time but may change her mind in the future  Deals with chronic lymphedema    Review of Systems   Constitutional:  Negative for chills, fever and unexpected weight change.   HENT:  Negative for congestion, hearing loss, rhinorrhea and sore throat.    Eyes:  Negative for pain and visual disturbance.   Respiratory:  Positive for cough. Negative for chest tightness, shortness of breath and wheezing.    Cardiovascular:  Negative for chest pain and palpitations.   Gastrointestinal:  Negative for abdominal pain, blood in stool, constipation, diarrhea, nausea and vomiting.   Endocrine: Negative for cold intolerance, heat intolerance, polydipsia and polyphagia.   Genitourinary:  Negative for dysuria, frequency and hematuria.   Musculoskeletal:  Negative for arthralgias and myalgias.   Skin:  Negative for rash and wound.   Neurological:  Negative for dizziness, syncope and headaches.   Psychiatric/Behavioral:  Negative for dysphoric mood. The patient is not nervous/anxious.        Objective   BP (!) 132/98   Pulse 89   Temp 36.4 °C (97.5 °F)   Resp 16   Ht 1.651 m (5' 5\")   Wt 77.2 kg (170 lb 3.2 oz)   SpO2 98%   BMI 28.32 kg/m²     Physical Exam  Vitals reviewed.   Constitutional:       Appearance: Normal appearance. She is not ill-appearing.   HENT:      Head: Normocephalic and atraumatic.      Right " Ear: Tympanic membrane normal.      Left Ear: Tympanic membrane normal.      Nose: Nose normal.      Mouth/Throat:      Mouth: Mucous membranes are moist.      Pharynx: Oropharynx is clear.   Eyes:      Extraocular Movements: Extraocular movements intact.      Conjunctiva/sclera: Conjunctivae normal.      Pupils: Pupils are equal, round, and reactive to light.   Neck:      Comments: Lymphedema present in neck. No goiter noted.  Cardiovascular:      Rate and Rhythm: Normal rate and regular rhythm.      Heart sounds: Normal heart sounds. No murmur heard.     No gallop.   Pulmonary:      Effort: Pulmonary effort is normal. No respiratory distress.      Breath sounds: Normal breath sounds. No wheezing.   Chest:   Breasts:     Right: Skin change (scar present from previous surgery) present. No mass.      Left: Normal. No mass.   Abdominal:      General: There is no distension.      Palpations: Abdomen is soft. There is no mass.      Tenderness: There is no abdominal tenderness.   Musculoskeletal:         General: No swelling.      Cervical back: Neck supple.      Right lower leg: No edema.      Left lower leg: No edema.   Lymphadenopathy:      Cervical: No cervical adenopathy.   Neurological:      General: No focal deficit present.      Mental Status: She is alert and oriented to person, place, and time.      Gait: Gait normal.   Psychiatric:         Mood and Affect: Mood normal.         Behavior: Behavior normal.         Thought Content: Thought content normal.         Assessment/Plan   Problem List Items Addressed This Visit             ICD-10-CM    Asthma J45.909    Relevant Medications    fluticasone (Flovent HFA) 110 mcg/actuation inhaler    Elevated glucose R73.09    Relevant Orders    Hemoglobin A1c     Other Visit Diagnoses         Codes    Healthcare maintenance    -  Primary Z00.00    Relevant Orders    Comprehensive Metabolic Panel    Lipid Panel             CPE completed.  Advised to keep a heart healthy, low  fat diet with fruits and veggies like Mediterranean diet.  Advised on the importance of exercise and maintaining 150 minutes of exercise per week (30 minutes per day 5 days a week).  Advised on regular eye and dental visits.  Discussed age appropriate cancer screening, immunizations and recommendations given.  Discussed avoiding illicit drugs and tobacco. Advised on appropriate use of alcohol.  Advised to wear seat belt.     Elevated LFTS: repeat 3 months--never did, ordered     Mild intermittent asthma:  -has had many flares and has been in low 80s pulse ox in past.   -seeing pulmonary  -back on flovent  -unable to do singulair anymore as makes her dizzy  -on proair as needed  -generally requires 12 day steroid course with flares  -doing flonase     Had COVID: not tested but family had it and her symptoms resolved     Chronic Lymphedema:  -goes go lymphedema clinic for head and neck twice a month  -to schedule CT neck     Right breast cancer in situ 3/19 s/p right mastectomy and LN dissection: no chemo or radiation, follows with Dr. Julian  -declines followup and mammograms at this time     CPE 1 year. Labs  Health Maintenance  -Pap smear: she declines today and will do next year  -Mammogram: 7/20--follows with Dr. Julian, ordered  -Colonoscopy: declines c-scope and cologuard  -Lung Cancer Screening: never smoker  -Vaccinations: denies influenza, shingrix and pneumovax (she will think about)  -DEXA: at 66

## 2024-02-07 ENCOUNTER — TREATMENT (OUTPATIENT)
Dept: OCCUPATIONAL THERAPY | Facility: CLINIC | Age: 59
End: 2024-02-07
Payer: COMMERCIAL

## 2024-02-07 DIAGNOSIS — I97.2 LYMPHEDEMA SYNDROME, POSTMASTECTOMY: ICD-10-CM

## 2024-02-07 DIAGNOSIS — M43.6 NECK STIFFNESS: ICD-10-CM

## 2024-02-07 DIAGNOSIS — I89.0 LYMPHEDEMA OF FACE: ICD-10-CM

## 2024-02-07 DIAGNOSIS — I89.0 LYMPHEDEMA: Primary | ICD-10-CM

## 2024-02-07 PROCEDURE — 97110 THERAPEUTIC EXERCISES: CPT | Mod: GO

## 2024-02-07 PROCEDURE — 97140 MANUAL THERAPY 1/> REGIONS: CPT | Mod: GO

## 2024-02-07 NOTE — PROGRESS NOTES
"Occupational Therapy    Occupational Therapy Treatment    Name: Miroslava Roman  MRN: [id number]  : 1965  Date: 2024    Time Calculation  Start Time: 1005  Stop Time: 1100  Time Calculation (min): 55 min    Visit #3 of 60 in     1. Lymphedema        2. Lymphedema syndrome, postmastectomy        3. Neck stiffness        4. Lymphedema of face          Assessment:  Pt with noted decreased fullness in abdomen, trunk and head/neck post tx, pt reports less pain and discomfort.  Home management strategies upgraded to pt tolerance.    Plan:  Frequency and duration:. Continue occupational therapy for modified trunk, abdominal, head and neck, UE lymphedema complete decongestive therapy (CDT), exercises, stretches for decongestion and mobility, pain management, increase optimal function; 1x for every other week. Increase frequency as needed. Modify, upgrade tx/strategies due to pt fluctuating swelling and pain levels. Support voice function.       Subjective   Patient reports she feels her whole body is full, tight, uncomfortable \"stiffness, pain\"  Is distended and full, \"coat doesn't close as easily\".  Pt reports eyes are swollen.  Left arm is more full.  Pt had physical with doctor yesterday, discussed lymphedema, continue with lymphedema tx.    Pain Assessment:  Legs, left arm, back hip pain. Generally 5/10.  Abdomen discomfort 4/10.  Face feels full, stiff, 4/10.     Objective      Objective       Head and neck edema located in the following area(s): .   Face: face (bilaterally) midface nose upper lip   Cheek: full bilaterally  Jowl:/Jaw line: full bilaterally   Lower Eyelid: puffy, full bilaterally      Additional Information: Full abdomen, firm  Face and neck full. Eyes full, discomfort   Post neck tight.  Bilateral axilla full.  L forearm full per pt.          Treatment  55 minutes    Manual Therapy   40  OT provided Manual Lymph Drainage   Deep, superficial abdomen, abdominal scar massage, hips, thighs, " low back MLD. Repeat abdomen.  Exercises intermittently  MLD trunk, axilla, neck, face, gentle soft tissue stretches face, neck  LUE MLD  -softened tissue texture post treatment.   Less tightness and discomfort reported.     There Ex: 15  [employer] bike 3 minutes at start of tx.  Knees to chest, rotate hips 5x each way during MLD  Walk in clinic for movement and restroom to void bladder   Clamshells on mat 3x per side  Breathing intermittently

## 2024-02-08 ENCOUNTER — APPOINTMENT (OUTPATIENT)
Dept: OCCUPATIONAL THERAPY | Facility: CLINIC | Age: 59
End: 2024-02-08
Payer: COMMERCIAL

## 2024-02-12 ENCOUNTER — APPOINTMENT (OUTPATIENT)
Dept: OCCUPATIONAL THERAPY | Facility: CLINIC | Age: 59
End: 2024-02-12
Payer: COMMERCIAL

## 2024-02-13 ENCOUNTER — TREATMENT (OUTPATIENT)
Dept: OCCUPATIONAL THERAPY | Facility: CLINIC | Age: 59
End: 2024-02-13
Payer: COMMERCIAL

## 2024-02-13 DIAGNOSIS — I89.0 LYMPHEDEMA OF FACE: ICD-10-CM

## 2024-02-13 DIAGNOSIS — I89.0 LYMPHEDEMA: Primary | ICD-10-CM

## 2024-02-13 DIAGNOSIS — M43.6 NECK STIFFNESS: ICD-10-CM

## 2024-02-13 DIAGNOSIS — I97.2 LYMPHEDEMA SYNDROME, POSTMASTECTOMY: ICD-10-CM

## 2024-02-13 PROCEDURE — 97140 MANUAL THERAPY 1/> REGIONS: CPT | Mod: GO,CO

## 2024-02-13 NOTE — PROGRESS NOTES
Occupational Therapy    Occupational Therapy Treatment    Name: Miroslava Roman  MRN: [id number]  : 1965  Date: 2024    Time Calculation  Start Time: 1118  Stop Time: 1213  Time Calculation (min): 55 min    Visit #4 of 60 in     1. Lymphedema        2. Lymphedema of face        3. Lymphedema syndrome, postmastectomy        4. Neck stiffness              Assessment:  Pt appears to tolerate treatment well with no complaints of pain. Good tissue texture softening post MLD, decreased swelling in abdomen and neck, increased ROM. GARCIA instructs pt to use large exercise ball once returning home to further stretch shoulders and chest to continue lymphatic decongestion, pt agreeable.    Plan:  Frequency and duration:. Continue occupational therapy for modified trunk, abdominal, head and neck, UE lymphedema complete decongestive therapy (CDT), exercises, stretches for decongestion and mobility, pain management, increase optimal function; 1x for every other week. Increase frequency as needed. Modify, upgrade tx/strategies due to pt fluctuating swelling and pain levels. Support voice function.       Subjective   Pt states she feels she was able to keep her abdomen soft this week however it is full. Pt states her peripheral vision is less    Pain Assessment:  2/10 ache right hip     Objective             Head and neck edema located in the following area(s): .   Face: face (bilaterally) midface nose upper lip   Cheek: full bilaterally  Jowl:/Jaw line: full bilaterally   Lower Eyelid: puffy, full bilaterally      Additional Information: Full abdomen, firm  Face and neck full. Eyes full, discomfort   Post neck tight.  Bilateral axilla full.  L forearm full per pt.          Treatment  55 minutes    Manual Therapy   55  GARCIA provided Manual Lymph Drainage   Deep, superficial abdomen hips, thighs, low back MLD. Repeat abdomen.  MLD supported by Graston tools on abdomen.  Exercises intermittently  MLD trunk, axilla, neck,  face, gentle soft tissue stretches face, neck  -softened tissue texture post treatment.   Less tightness and discomfort reported.

## 2024-02-20 ENCOUNTER — TREATMENT (OUTPATIENT)
Dept: OCCUPATIONAL THERAPY | Facility: CLINIC | Age: 59
End: 2024-02-20
Payer: COMMERCIAL

## 2024-02-20 DIAGNOSIS — I89.0 LYMPHEDEMA OF FACE: ICD-10-CM

## 2024-02-20 DIAGNOSIS — I89.0 LYMPHEDEMA: Primary | ICD-10-CM

## 2024-02-20 DIAGNOSIS — M43.6 NECK STIFFNESS: ICD-10-CM

## 2024-02-20 DIAGNOSIS — I97.2 LYMPHEDEMA SYNDROME, POSTMASTECTOMY: ICD-10-CM

## 2024-02-20 DIAGNOSIS — R29.898 WEAKNESS OF LEFT ARM: ICD-10-CM

## 2024-02-20 PROCEDURE — 97140 MANUAL THERAPY 1/> REGIONS: CPT | Mod: GO | Performed by: OCCUPATIONAL THERAPIST

## 2024-02-20 NOTE — PROGRESS NOTES
Occupational Therapy    Occupational Therapy Treatment    Name: Miroslava Roman  MRN: [id number]  : 1965  Date: 1/15/2023  Visit #5 of 60 in            Assessment:  Patient with noted softened tissue texture and decreased tightness in abdomen, legs, trunk, head and neck post treatment     Plan:  Frequency and duration:. Continue occupational therapy for modified trunk, abdominal, head and neck, UE lymphedema complete decongestive therapy (CDT), exercises, stretches for decongestion and mobility, pain management, increase optimal function; 1x for every other week. Increase frequency as needed. Modify, upgrade tx/strategies due to pt fluctuating swelling and pain levels. Support voice function       Subjective   Patient reports she was working on self manual lymph drainage for her neck and shoulders yesterday.     Pain Assessment:       Objective    Therapy Diagnosis  1. Lymphedema        2. Lymphedema of face        3. Lymphedema syndrome, postmastectomy        4. Neck stiffness        5. Weakness of left arm                  Objective       Head and neck edema located in the following area(s): .   Face: face (bilaterally) midface nose upper lip   Cheek:full bilaterally  Jowl:/Jaw line: full bilaterally   Lower Eyelid: puffy bilaterally      Additional Information: Full abdomen, firm  Face and neck full. Eyes full.   Post neck tight.  Bilateral axilla full.  L forearm full per pt.            Treatment  54 minutes    Manual Therapy  54   OT provided Manual Lymph Drainage to abdomen (paired with diaphragmatic breathing) both deep and superficial, B trunk, BLEs, neck (anterior and posterior), and face   -softened tissue texture post treatment

## 2024-02-26 ENCOUNTER — TREATMENT (OUTPATIENT)
Dept: OCCUPATIONAL THERAPY | Facility: CLINIC | Age: 59
End: 2024-02-26
Payer: COMMERCIAL

## 2024-02-26 DIAGNOSIS — I89.0 LYMPHEDEMA OF FACE: ICD-10-CM

## 2024-02-26 DIAGNOSIS — R29.898 WEAKNESS OF LEFT ARM: ICD-10-CM

## 2024-02-26 DIAGNOSIS — M43.6 NECK STIFFNESS: ICD-10-CM

## 2024-02-26 DIAGNOSIS — I89.0 LYMPHEDEMA: Primary | ICD-10-CM

## 2024-02-26 DIAGNOSIS — I97.2 LYMPHEDEMA SYNDROME, POSTMASTECTOMY: ICD-10-CM

## 2024-02-26 PROCEDURE — 97140 MANUAL THERAPY 1/> REGIONS: CPT | Mod: GO

## 2024-02-26 PROCEDURE — 97110 THERAPEUTIC EXERCISES: CPT | Mod: GO

## 2024-02-26 NOTE — PROGRESS NOTES
"Occupational Therapy    Occupational Therapy Treatment    Name: Miroslava Roman  MRN: [id number]  : 1965  Date: 24     Visit #6 of 60 in      Assessment:  Patient with noted softened tissue texture and decreased tightness in abdomen, legs, trunk, head and neck post treatment.  Pt reports feeling better.   Pt voice less congested, clearer sound, pt reports less strain.    Plan:  Frequency and duration:. Continue occupational therapy for modified trunk, abdominal, head and neck, UE lymphedema complete decongestive therapy (CDT), exercises, stretches for decongestion and mobility, pain management, increase optimal function; 1x for every other week. Increase frequency as needed. Modify, upgrade tx/strategies due to pt fluctuating swelling and pain levels. Support voice function       Subjective   Patient reports abdomen, legs, softer. Legs \"cramping\"  R hip, glute tight, using Theragun at home.  Pt reports fullness face, neck, \"not as bad as it was\"     Pain Assessment:  Fullness, tightness throughout per pt.     Objective    Therapy Diagnosis  1. Lymphedema        2. Lymphedema syndrome, postmastectomy        3. Neck stiffness        4. Lymphedema of face        5. Weakness of left arm          Objective       Head and neck edema located in the following area(s): .   Face: face (bilaterally) midface nose upper lip   Cheek:full bilaterally  Jowl:/Jaw line: full bilaterally   Lower Eyelid: puffy bilaterally     Neck, chest full.   Additional Information: Full abdomen, moderately firm  Post neck tight.  Bilateral axilla full.  Abdomen , thighs full.    Treatment  45 minutes    Manual Therapy  30   OT provided Manual Lymph Drainage (MLD) to abdomen (paired with diaphragmatic breathing) both deep and superficial, B trunk, hips, abdominal scar tissue massage;  axilla, chest, neck clearing. Post neck clearing.  Submental clearing, face, chin MLD.  Pair MLD with gentle soft tissue techniques.  Pt notes less " "tightness, felt things started \"moving\" (decrease in tight pressure)/BLEs, neck (anterior and posterior), and face   -softened tissue texture post treatment Neck ROM    Therapeutic Exercise 15  Neck ROM.  Jaw, mouth stretches  Supine:  - hip knees to hips, rotate x5 each side.  - leg/hip abduction slides x5 each side  - bridges x5  Pt warmed up on stationary bike 4 minutes before session.              "

## 2024-03-01 ENCOUNTER — OFFICE VISIT (OUTPATIENT)
Dept: DERMATOLOGY | Facility: CLINIC | Age: 59
End: 2024-03-01
Payer: COMMERCIAL

## 2024-03-01 DIAGNOSIS — D18.01 HEMANGIOMA OF SKIN: ICD-10-CM

## 2024-03-01 DIAGNOSIS — D22.72 MELANOCYTIC NEVI OF LEFT LOWER EXTREMITY OR HIP: ICD-10-CM

## 2024-03-01 DIAGNOSIS — L91.8 SKIN TAG: ICD-10-CM

## 2024-03-01 DIAGNOSIS — D22.71 MELANOCYTIC NEVI OF RIGHT LOWER LIMB, INCLUDING HIP: ICD-10-CM

## 2024-03-01 DIAGNOSIS — L82.1 SEBORRHEIC KERATOSIS: ICD-10-CM

## 2024-03-01 DIAGNOSIS — L72.0 MILIA: ICD-10-CM

## 2024-03-01 DIAGNOSIS — Z12.83 ENCOUNTER FOR SCREENING FOR MALIGNANT NEOPLASM OF SKIN: Primary | ICD-10-CM

## 2024-03-01 DIAGNOSIS — L57.8 PHOTOAGING OF SKIN: ICD-10-CM

## 2024-03-01 DIAGNOSIS — L81.4 LENTIGO: ICD-10-CM

## 2024-03-01 DIAGNOSIS — D22.5 MELANOCYTIC NEVI OF TRUNK: ICD-10-CM

## 2024-03-01 DIAGNOSIS — D22.60 MELANOCYTIC NEVI OF UNSPECIFIED UPPER LIMB, INCLUDING SHOULDER: ICD-10-CM

## 2024-03-01 PROCEDURE — 99213 OFFICE O/P EST LOW 20 MIN: CPT | Performed by: DERMATOLOGY

## 2024-03-01 PROCEDURE — 1036F TOBACCO NON-USER: CPT | Performed by: DERMATOLOGY

## 2024-03-01 ASSESSMENT — DERMATOLOGY PATIENT ASSESSMENT
ARE YOU ON BIRTH CONTROL: NO
DO YOU HAVE ANY NEW OR CHANGING LESIONS: NO
FOR PATIENTS COMING IN FOR A FOLLOW-UP VISIT - HAVE THERE BEEN ANY CHANGES IN YOUR HEALTH SINCE YOUR LAST VISIT: NO
ARE YOU AN ORGAN TRANSPLANT RECIPIENT: NO
DO YOU HAVE IRREGULAR MENSTRUAL CYCLES: NO
DO YOU USE A TANNING BED: NO
HAVE YOU HAD OR DO YOU HAVE VASCULAR DISEASE: NO
HAVE YOU HAD OR DO YOU HAVE A STAPH INFECTION: NO
DO YOU USE SUNSCREEN: DAILY
ARE YOU TRYING TO GET PREGNANT: NO

## 2024-03-01 ASSESSMENT — DERMATOLOGY QUALITY OF LIFE (QOL) ASSESSMENT
RATE HOW BOTHERED YOU ARE BY SYMPTOMS OF YOUR SKIN PROBLEM (EG, ITCHING, STINGING BURNING, HURTING OR SKIN IRRITATION): 0 - NEVER BOTHERED
ARE THERE EXCLUSIONS OR EXCEPTIONS FOR THE QUALITY OF LIFE ASSESSMENT: NO
WHAT SINGLE SKIN CONDITION LISTED BELOW IS THE PATIENT ANSWERING THE QUALITY-OF-LIFE ASSESSMENT QUESTIONS ABOUT: NONE OF THE ABOVE
RATE HOW BOTHERED YOU ARE BY EFFECTS OF YOUR SKIN PROBLEMS ON YOUR ACTIVITIES (EG, GOING OUT, ACCOMPLISHING WHAT YOU WANT, WORK ACTIVITIES OR YOUR RELATIONSHIPS WITH OTHERS): 0 - NEVER BOTHERED
DATE THE QUALITY-OF-LIFE ASSESSMENT WAS COMPLETED: 66900
RATE HOW EMOTIONALLY BOTHERED YOU ARE BY YOUR SKIN PROBLEM (FOR EXAMPLE, WORRY, EMBARRASSMENT, FRUSTRATION): 0 - NEVER BOTHERED

## 2024-03-01 ASSESSMENT — ITCH NUMERIC RATING SCALE: HOW SEVERE IS YOUR ITCHING?: 0

## 2024-03-01 ASSESSMENT — PATIENT GLOBAL ASSESSMENT (PGA): PATIENT GLOBAL ASSESSMENT: PATIENT GLOBAL ASSESSMENT:  1 - CLEAR

## 2024-03-01 NOTE — PROGRESS NOTES
Subjective     Miroslava Roman is a 58 y.o. female who presents for the following: Skin Check (Pt here to follow up for FBSE with no hx. Pt reports no areas of concern at this time. ).     Review of Systems:  No other skin or systemic complaints other than what is documented elsewhere in the note.    The following portions of the chart were reviewed this encounter and updated as appropriate:         Skin Cancer History  No skin cancer on file.      Specialty Problems          Dermatology Problems    Dermatophytosis of foot    Contusion of knee    Melanocytic nevi of other parts of face    Melanocytic nevi of trunk    Melanocytic nevi of unspecified lower limb, including hip    Melanocytic nevi of unspecified upper limb, including shoulder    Other melanin hyperpigmentation    Other seborrheic keratosis    Sebaceous cyst        Objective   Well appearing patient in no apparent distress; mood and affect are within normal limits.    A full examination was performed including scalp, head, eyes, ears, nose, lips, neck, chest, axillae, abdomen, back, buttocks, bilateral upper extremities, bilateral lower extremities, hands, feet, fingers, toes, fingernails, and toenails. All findings within normal limits unless otherwise noted below.    Assessment/Plan   1. Encounter for screening for malignant neoplasm of skin  No suspicious lesions noted on examination today    The risk of chronic, cumulative sun damage and risk of development of skin cancer was reviewed today.   The importance of sun protection was reviewed: including the use of a broad spectrum sunscreen that protects against both UVA/UVB rays, with ingredients such as Zinc oxide or titanium dioxide, wearing sun protective clothing and sun avoidance. We reviewed the warning signs of non-melanoma skin cancer and ABCDEs of melanoma  Please follow up should you notice any new or changing pre-existing skin lesion.    Related Procedures  Follow Up In Dermatology -  Established Patient    2. Photoaging of skin  Mottled pigmentation with telangiectasias and brown reticular macules in sun exposed areas of the body.    The risk of chronic, cumulative sun damage and risk of development of skin cancer was reviewed today.   The importance of sun protection was reviewed: including the use of a broad spectrum sunscreen that protects against both UVA/UVB rays, with ingredients such as Zinc oxide or titanium dioxide, wearing sun protective clothing and sun avoidance. We reviewed the warning signs of non-melanoma skin cancer and ABCDEs of melanoma  Please follow up should you notice any new or changing pre-existing skin lesion.    Related Procedures  Follow Up In Dermatology - Established Patient    3. Skin tag  Left Axilla  Fleshy, skin-colored sessile and pedunculated papules.     Benign growths that most commonly occur in areas of friction and rubbing, specifically the neck, axilla, and inguinal creases. No treatment is necessary. If lesions are symptomatic, they can be removed, however regardless of symptoms some insurances may not cover this procedure.    3 lesions treated as courtesy today in the left axilla    Destr of lesion - Left Axilla  Complexity: simple    Destruction method comment:  Hyfrecator  Informed consent: discussed and consent obtained    Procedure prep:  Patient prepped in sterile fashion  Outcome: patient tolerated procedure well with no complications    Post-procedure details: wound care instructions given      4. Seborrheic keratosis (2)  Generalized, Left Lower Back  Brown, tan waxy macules and stuck on appearing papules and plaques    The benign nature of these skin lesions reviewed, reassure provided and no further treatment needed at this time.   These lesions can be removed, if symptomatic (itching, bleeding, rubbing on clothing, painful), otherwise removal is considered cosmetic.     5. Lentigo  Scattered tan macules in sun-exposed areas.    These are  benign skin lesions due to sun exposure. They will darken in response to sun exposure. They should be monitored for change in size, shape or color.  These lesions can be treated cosmetically with topical creams, liquid nitrogen and a variety of lasers.    6. Hemangioma of skin  Cherry red papules    The benign nature of these skin lesions were reviewed, no treatment is necessary.   Please follow up for any new or pre-existing lesion that is changing in size, shape, color, becomes painful, tender, itches or bleed.    7. Melanocytic nevi of left lower extremity or hip  Left Leg  Scattered, uniform and benign-appearing, regular brown melanocytic papules and macules.    Clinically benign appearing nevi, no treatment is necessary.  The importance of sun protection was reviewed: including the use of a broad spectrum sunscreen that protects against both UVA/UVB rays, with ingredients such as Zinc oxide or titanium dioxide, wearing sun protective clothing and sun avoidance.   ABCDEs of melanoma reviewed.  Please follow up should you notice any new or changing pre-existing skin lesion.    8. Melanocytic nevi of right lower limb, including hip  Right Leg  Scattered, uniform and benign-appearing, regular brown melanocytic papules and macules.    Clinically benign appearing nevi, no treatment is necessary.  The importance of sun protection was reviewed: including the use of a broad spectrum sunscreen that protects against both UVA/UVB rays, with ingredients such as Zinc oxide or titanium dioxide, wearing sun protective clothing and sun avoidance.   ABCDEs of melanoma reviewed.  Please follow up should you notice any new or changing pre-existing skin lesion.    9. Melanocytic nevi of unspecified upper limb, including shoulder (2)  Left Arm, Right Arm  Scattered, uniform and benign-appearing, regular brown melanocytic papules and macules.    Clinically benign appearing nevi, no treatment is necessary.  The importance of sun  protection was reviewed: including the use of a broad spectrum sunscreen that protects against both UVA/UVB rays, with ingredients such as Zinc oxide or titanium dioxide, wearing sun protective clothing and sun avoidance.   ABCDEs of melanoma reviewed.  Please follow up should you notice any new or changing pre-existing skin lesion.    10. Melanocytic nevi of trunk  Torso - Posterior (Back)  Tan-brown symmetric macules and papules    Clinically benign appearing nevi, no treatment is necessary.  The importance of sun protection was reviewed: including the use of a broad spectrum sunscreen that protects against both UVA/UVB rays, with ingredients such as Zinc oxide or titanium dioxide, wearing sun protective clothing and sun avoidance.   ABCDEs of melanoma reviewed.  Please follow up should you notice any new or changing pre-existing skin lesion.    11. Milia  Head - Anterior (Face)  Small 1-2 mm white papules.    The benign nature of these skin lesions were reviewed, no treatment is necessary.   Please follow up for any new or pre-existing lesion that is changing in size, shape, color, becomes painful, tender, itches or bleed.

## 2024-03-08 DIAGNOSIS — J45.20 MILD INTERMITTENT ASTHMA WITHOUT COMPLICATION (HHS-HCC): ICD-10-CM

## 2024-03-08 RX ORDER — ALBUTEROL SULFATE 90 UG/1
1-2 AEROSOL, METERED RESPIRATORY (INHALATION) AS NEEDED
Qty: 6.7 G | Refills: 1 | Status: SHIPPED | OUTPATIENT
Start: 2024-03-08

## 2024-03-11 ENCOUNTER — TREATMENT (OUTPATIENT)
Dept: OCCUPATIONAL THERAPY | Facility: CLINIC | Age: 59
End: 2024-03-11
Payer: COMMERCIAL

## 2024-03-11 DIAGNOSIS — I97.2 LYMPHEDEMA SYNDROME, POSTMASTECTOMY: ICD-10-CM

## 2024-03-11 DIAGNOSIS — I89.0 LYMPHEDEMA: Primary | ICD-10-CM

## 2024-03-11 DIAGNOSIS — M43.6 NECK STIFFNESS: ICD-10-CM

## 2024-03-11 DIAGNOSIS — I89.0 LYMPHEDEMA OF FACE: ICD-10-CM

## 2024-03-11 PROCEDURE — 97140 MANUAL THERAPY 1/> REGIONS: CPT | Mod: GO | Performed by: OCCUPATIONAL THERAPIST

## 2024-03-11 PROCEDURE — 97110 THERAPEUTIC EXERCISES: CPT | Mod: GO | Performed by: OCCUPATIONAL THERAPIST

## 2024-03-11 NOTE — PROGRESS NOTES
Occupational Therapy    Occupational Therapy Treatment    Name: Miroslava Roman  MRN: [id number]  : 1965  Date: 24     Visit #7 of 60 in      Assessment:  Patient with relief post treatment.  Patient self reports increased ease with swallowing and decreased fullness throughout.     Plan:  Frequency and duration:. Continue occupational therapy for modified trunk, abdominal, head and neck, UE lymphedema complete decongestive therapy (CDT), exercises, stretches for decongestion and mobility, pain management, increase optimal function; 1x for every other week. Increase frequency as needed. Modify, upgrade tx/strategies due to pt fluctuating swelling and pain levels. Support voice function       Subjective   Patient she went to the chiropractor on  to have her hip put back in place.  Pt reports significant pain relief.      Pain Assessment:  Fullness, tightness throughout per pt.     Objective    Therapy Diagnosis  1. Lymphedema        2. Lymphedema of face        3. Lymphedema syndrome, postmastectomy        4. Neck stiffness          Objective       Head and neck edema located in the following area(s): .   Face: face (bilaterally) midface nose upper lip   Cheek:full bilaterally  Jowl:/Jaw line: full bilaterally   Lower Eyelid: puffy bilaterally     Neck, chest full.   Additional Information: Full abdomen, moderately firm  Post neck tight.  Bilateral axilla full.  Abdomen , thighs full.    Treatment  54 minutes    Manual Therapy  30   OT provided Manual Lymph Drainage (MLD) to abdomen (paired with diaphragmatic breathing), across pelvis, B trunk (anterior and posterior), chest, neck (anterior and posterior) and face   -softened tissue texture post treatment     Therapeutic Exercise 15  Chest opening stretches:     External rotation with arms at 90/90 behind head - 1x10  Scapular retraction - 1x10  Shoulder flexion with large therapy ball w/ 5 second hold at end range   Shoulder rolls

## 2024-03-20 ENCOUNTER — TREATMENT (OUTPATIENT)
Dept: OCCUPATIONAL THERAPY | Facility: CLINIC | Age: 59
End: 2024-03-20
Payer: COMMERCIAL

## 2024-03-20 DIAGNOSIS — M43.6 NECK STIFFNESS: ICD-10-CM

## 2024-03-20 DIAGNOSIS — I97.2 LYMPHEDEMA SYNDROME, POSTMASTECTOMY: ICD-10-CM

## 2024-03-20 DIAGNOSIS — I89.0 LYMPHEDEMA OF FACE: ICD-10-CM

## 2024-03-20 DIAGNOSIS — I89.0 LYMPHEDEMA: Primary | ICD-10-CM

## 2024-03-20 PROCEDURE — 97140 MANUAL THERAPY 1/> REGIONS: CPT | Mod: GO | Performed by: OCCUPATIONAL THERAPIST

## 2024-03-20 NOTE — PROGRESS NOTES
Occupational Therapy    Occupational Therapy Treatment    Name: Miroslava Roman  MRN: [id number]  : 1965  Date: 24     Visit #8 of 60 in      Assessment:  Patient with noted decreased fullness throughout post Manual Lymph Drainage Treatment.  Patient demonstrated improved mobility with therapy    Plan:  Frequency and duration:. Continue occupational therapy for modified trunk, abdominal, head and neck, UE lymphedema complete decongestive therapy (CDT), exercises, stretches for decongestion and mobility, pain management, increase optimal function; 1x for every other week. Increase frequency as needed. Modify, upgrade tx/strategies due to pt fluctuating swelling and pain levels. Support voice function       Subjective   Patient reports her jaw feels full on today's date.      Pain Assessment:  Fullness, tightness throughout per pt.     Objective    Therapy Diagnosis  1. Lymphedema        2. Lymphedema syndrome, postmastectomy        3. Lymphedema of face        4. Neck stiffness          Objective       Head and neck edema located in the following area(s): .   Face: face (bilaterally) midface nose upper lip   Cheek:full bilaterally  Jowl:/Jaw line: full bilaterally   Lower Eyelid: puffy bilaterally     Neck, chest full.   Additional Information: Full abdomen, moderately firm  Post neck tight.  Bilateral axilla full.  Abdomen , thighs full.    Treatment  54 minutes    Manual Therapy  54  OT provided Manual Lymph Drainage (MLD) to abdomen (paired with diaphragmatic breathing), B trunk (anterior and posterior), BLEs, neck (anterior and posterior) and face   -softened tissue texture post treatment

## 2024-03-25 ENCOUNTER — APPOINTMENT (OUTPATIENT)
Dept: OCCUPATIONAL THERAPY | Facility: CLINIC | Age: 59
End: 2024-03-25
Payer: COMMERCIAL

## 2024-04-01 ENCOUNTER — TREATMENT (OUTPATIENT)
Dept: OCCUPATIONAL THERAPY | Facility: CLINIC | Age: 59
End: 2024-04-01
Payer: COMMERCIAL

## 2024-04-01 DIAGNOSIS — I89.0 LYMPHEDEMA OF FACE: ICD-10-CM

## 2024-04-01 DIAGNOSIS — I97.2 LYMPHEDEMA SYNDROME, POSTMASTECTOMY: ICD-10-CM

## 2024-04-01 DIAGNOSIS — M43.6 NECK STIFFNESS: ICD-10-CM

## 2024-04-01 DIAGNOSIS — I89.0 LYMPHEDEMA: Primary | ICD-10-CM

## 2024-04-01 PROCEDURE — 97140 MANUAL THERAPY 1/> REGIONS: CPT | Mod: GO,CO

## 2024-04-01 NOTE — PROGRESS NOTES
Occupational Therapy    Occupational Therapy Treatment    Name: Miroslava Roman  MRN: [id number]  : 1965  Date: 24      Visit #9 of 60 in     Time Calculation  Start Time: 1506  Stop Time: 1603  Time Calculation (min): 57 min    Assessment:  Pt appears to tolerate treatment well with no complaints of pain. Good tissue texture softening post MLD.    Plan:  Frequency and duration:. Continue occupational therapy for modified trunk, abdominal, head and neck, UE lymphedema complete decongestive therapy (CDT), exercises, stretches for decongestion and mobility, pain management, increase optimal function; 1x for every other week. Increase frequency as needed. Modify, upgrade tx/strategies due to pt fluctuating swelling and pain levels. Support voice function       Subjective   Pt states since getting her hip put back into place at chiropractor she has felt relief in her abdomen from swelling. Pt states she feels most swollen in head/neck, some in legs and around her eyes.      Pain Assessment:  Fullness, tightness throughout per pt.     Objective    Therapy Diagnosis  1. Lymphedema        2. Lymphedema of face        3. Lymphedema syndrome, postmastectomy        4. Neck stiffness              Objective       Head and neck edema located in the following area(s): .   Face: face (bilaterally) midface nose upper lip   Cheek:full bilaterally  Jowl:/Jaw line: full bilaterally   Lower Eyelid: puffy bilaterally     Neck, chest full.   Additional Information: Full abdomen, moderately firm  Post neck tight.  Bilateral axilla full.  Abdomen , thighs full.    Treatment  57 minutes    Manual Therapy  57  GARCIA provided Manual Lymph Drainage (MLD) to abdomen (paired with diaphragmatic breathing), B trunk (anterior and posterior), BLEs, neck (anterior and posterior) and face   -Increased time spent providing MLD to head and neck this date due to fullness. GARCIA instructs pt in further techniques for home going, pt verbalizes  understanding.   -softened tissue texture post treatment

## 2024-04-09 ENCOUNTER — TREATMENT (OUTPATIENT)
Dept: OCCUPATIONAL THERAPY | Facility: CLINIC | Age: 59
End: 2024-04-09
Payer: COMMERCIAL

## 2024-04-09 DIAGNOSIS — M43.6 NECK STIFFNESS: ICD-10-CM

## 2024-04-09 DIAGNOSIS — I97.2 LYMPHEDEMA SYNDROME, POSTMASTECTOMY: ICD-10-CM

## 2024-04-09 DIAGNOSIS — I89.0 LYMPHEDEMA OF FACE: ICD-10-CM

## 2024-04-09 DIAGNOSIS — I89.0 LYMPHEDEMA: Primary | ICD-10-CM

## 2024-04-09 PROCEDURE — 97110 THERAPEUTIC EXERCISES: CPT | Mod: GO

## 2024-04-09 PROCEDURE — 97140 MANUAL THERAPY 1/> REGIONS: CPT | Mod: GO

## 2024-04-09 NOTE — PROGRESS NOTES
Occupational Therapy    Occupational Therapy Treatment    Name: Miroslava Roman  MRN: [id number]  : 1965  Date: 24  Visit #10 of 60 in     Time Calculation  Start Time: 1505  Stop Time: 1600  Time Calculation (min): 55 min    Assessment:  Post tx:  Pt reports less face/jaw fullness.  Pt voice more clear, less congestion.  Less firmness abdomen, though full.    Plan:  Continue occupational therapy for modified trunk, abdominal, head and neck, UE lymphedema complete decongestive therapy (CDT), exercises, stretches for decongestion and mobility, pain management, increase optimal function; 1x for every other week. Increase frequency as needed. Modify, upgrade tx/strategies due to pt fluctuating swelling and pain levels. Support voice function       Subjective   Pt states she feels full, had increased exercise yesterday, at a social outing at and walked home.  Pt reports her face and neck is full.     Pain Assessment:  Pressure, pain jaw, outer face.  4/10 pain per pt.    Objective    Therapy Diagnosis  1. Lymphedema        2. Lymphedema syndrome, postmastectomy        3. Lymphedema of face        4. Neck stiffness              Objective       Head and neck edema located in the following area(s): .   Face: face (bilaterally) midface nose upper lip   Cheek: full bilaterally  Jowl:/Jaw line: full bilaterally   Lower Eyelid: puffy bilaterally     Neck, chest full.   Additional Information: Full abdomen, firm throughout  Voice with congestion.    Treatment   55 minutes    Manual Therapy  40  OT provide Manual Lymph Drainage (MLD) to abdomen (paired with diaphragmatic breathing), first step of tx.  Pt had to leave to use bathroom (void), return for further tx , felt less abdominal tightness.   OT clear trunk, hips, abdomen (deep and superficial MLD).  OT clear axilla, upper chest/neck.  OT pair soft tissue techniques, MFR, with MLD - slow pace to pt tolerance.  OT provide MLD face, jaw, chin, neck, utilize both  ant and post neck pathways.  Exercises after MLD.  Good tissue texture softening. Pt reports she feels relief, feels she will have further relief throughout evening.    There ex 15  Neck, mouth ROM exercises, all planes.  Supine, lateral hip ROM, clam shells.  Seated on mat:  OT instruct trunk clearing exercises.  -raise one arm up to side over head while breathing in, reach arm to opposite side across head, laterally bend trunk while breathing out. 5x. Slow pace  -raise arms up over head, breathing in, bend forward while crossing arm, reach for floor while breathing out. Pt only tolerate 3 reps of these.

## 2024-04-22 ENCOUNTER — TREATMENT (OUTPATIENT)
Dept: OCCUPATIONAL THERAPY | Facility: CLINIC | Age: 59
End: 2024-04-22
Payer: COMMERCIAL

## 2024-04-22 DIAGNOSIS — I97.2 LYMPHEDEMA SYNDROME, POSTMASTECTOMY: ICD-10-CM

## 2024-04-22 DIAGNOSIS — I89.0 LYMPHEDEMA OF FACE: ICD-10-CM

## 2024-04-22 DIAGNOSIS — I89.0 LYMPHEDEMA: Primary | ICD-10-CM

## 2024-04-22 DIAGNOSIS — M43.6 NECK STIFFNESS: ICD-10-CM

## 2024-04-22 PROCEDURE — 97140 MANUAL THERAPY 1/> REGIONS: CPT | Mod: GO,CO

## 2024-04-22 PROCEDURE — 97110 THERAPEUTIC EXERCISES: CPT | Mod: GO,CO

## 2024-05-07 ENCOUNTER — TREATMENT (OUTPATIENT)
Dept: OCCUPATIONAL THERAPY | Facility: CLINIC | Age: 59
End: 2024-05-07
Payer: COMMERCIAL

## 2024-05-07 DIAGNOSIS — I89.0 LYMPHEDEMA OF FACE: Primary | ICD-10-CM

## 2024-05-07 DIAGNOSIS — I97.2 LYMPHEDEMA SYNDROME, POSTMASTECTOMY: ICD-10-CM

## 2024-05-07 DIAGNOSIS — I89.0 LYMPHEDEMA: ICD-10-CM

## 2024-05-07 DIAGNOSIS — M62.81 GENERALIZED MUSCLE WEAKNESS: ICD-10-CM

## 2024-05-07 PROCEDURE — 97140 MANUAL THERAPY 1/> REGIONS: CPT | Mod: GO,CO

## 2024-05-07 PROCEDURE — 97110 THERAPEUTIC EXERCISES: CPT | Mod: GO,CO

## 2024-05-07 NOTE — PROGRESS NOTES
Occupational Therapy    Occupational Therapy Treatment    Name: Miroslava Roman  MRN: [id number]  : 1965  Date: 24  Visit #12 of 60 in     Time Calculation  Start Time: 1405  Stop Time: 1500  Time Calculation (min): 55 min    1. Lymphedema of face        2. Lymphedema        3. Lymphedema syndrome, postmastectomy        4. Generalized muscle weakness            Assessment:  Pt appears to tolerate treatment well with no complaints of pain. Pt states she feels less stiff, less swollen in abdomen.     Plan:  Continue occupational therapy for modified trunk, abdominal, head and neck, UE lymphedema complete decongestive therapy (CDT), exercises, stretches for decongestion and mobility, pain management, increase optimal function; 1x for every other week. Increase frequency as needed. Modify, upgrade tx/strategies due to pt fluctuating swelling and pain levels. Support voice function       Subjective   Pt states she has been feeling the most swollen in her abdomen and her groin.      Pain Assessment:  Discomfort, mostly in abdomen    Objective    Therapy Diagnosis  1. Lymphedema of face        2. Lymphedema        3. Lymphedema syndrome, postmastectomy        4. Generalized muscle weakness                Objective       Head and neck edema located in the following area(s): .   Face: face (bilaterally) midface nose upper lip   Cheek: full bilaterally  Jowl:/Jaw line: full bilaterally   Lower Eyelid: puffy bilaterally     Neck, chest full.   Additional Information: Full abdomen, firm throughout  Voice with congestion.    Treatment  55  minutes    Manual Therapy  45  GARCIA provide Manual Lymph Drainage (MLD) to abdomen (paired with diaphragmatic breathing), first step of tx.  Pt had to leave to use bathroom (void), return for further tx , felt less abdominal tightness.   GARCIA clear trunk, hips, abdomen (deep and superficial MLD).  GARCIA clear axilla, upper chest/neck.   GARCIA provide MLD face, jaw, chin, neck,  utilize both ant and post neck pathways.  Exercises after MLD.  Good tissue texture softening. Pt reports she feels relief, feels she will have further relief throughout evening.    Ther ex 10  GARCIA instructs pt in trunk clearing exercises, abdominal twist side to side, clamshells

## 2024-05-20 ENCOUNTER — TREATMENT (OUTPATIENT)
Dept: OCCUPATIONAL THERAPY | Facility: CLINIC | Age: 59
End: 2024-05-20
Payer: COMMERCIAL

## 2024-05-20 DIAGNOSIS — I89.0 LYMPHEDEMA OF FACE: Primary | ICD-10-CM

## 2024-05-20 DIAGNOSIS — I89.0 LYMPHEDEMA: ICD-10-CM

## 2024-05-20 DIAGNOSIS — I97.2 LYMPHEDEMA SYNDROME, POSTMASTECTOMY: ICD-10-CM

## 2024-05-20 DIAGNOSIS — M62.81 GENERALIZED MUSCLE WEAKNESS: ICD-10-CM

## 2024-05-20 PROCEDURE — 97140 MANUAL THERAPY 1/> REGIONS: CPT | Mod: GO,CO

## 2024-05-20 PROCEDURE — 97110 THERAPEUTIC EXERCISES: CPT | Mod: GO,CO

## 2024-05-20 NOTE — PROGRESS NOTES
Occupational Therapy    Occupational Therapy Treatment    Name: Miroslava Roman  MRN: [id number]  : 1965  Date: 24  Visit #13 of 60 in     Time In 1403p  Time Out 1458p  Total Time 55 mins    Assessment:  Pt appears to tolerate treatment well with no complaints of pain. Pt states she feels less stiff in neck post MLD and stretches.    Plan:  Continue occupational therapy for modified trunk, abdominal, head and neck, UE lymphedema complete decongestive therapy (CDT), exercises, stretches for decongestion and mobility, pain management, increase optimal function; 1x for every other week. Increase frequency as needed. Modify, upgrade tx/strategies due to pt fluctuating swelling and pain levels. Support voice function       Subjective   Pt states her hip has been feeling better. Pt states she used her massage gun multiple times last week.       Pain Assessment:  Discomfort, mostly in abdomen    Objective    Therapy Diagnosis  1. Lymphedema of face        2. Lymphedema        3. Generalized muscle weakness        4. Lymphedema syndrome, postmastectomy              Objective       Head and neck edema located in the following area(s): .   Face: face (bilaterally) midface nose upper lip   Cheek: full bilaterally  Jowl:/Jaw line: full bilaterally   Lower Eyelid: puffy bilaterally     Neck, chest full.   Additional Information: Full abdomen, firm throughout  Voice with congestion.    Treatment  55  minutes    Manual Therapy  45  GARCIA provide Manual Lymph Drainage (MLD) to abdomen (paired with diaphragmatic breathing), first step of tx.  Pt had to leave to use bathroom (void), return for further tx , felt less abdominal tightness.   GARCIA clear trunk, hips, abdomen (deep and superficial MLD).  GARCIA clear axilla, upper chest/neck.   GARCIA provide MLD face, jaw, chin, neck, utilize both ant and post neck pathways.  Exercises after MLD.  Good tissue texture softening. Pt reports she feels relief, feels she will have  further relief throughout evening.    Ther ex 10  GARCIA instructs pt in trunk clearing exercises  Chin tuck 2x5  Cervical retraction 2x5    B UE shoulder abduction into B UE external rotation 2x5

## 2024-05-23 ENCOUNTER — TELEPHONE (OUTPATIENT)
Dept: PRIMARY CARE | Facility: CLINIC | Age: 59
End: 2024-05-23
Payer: COMMERCIAL

## 2024-05-23 NOTE — TELEPHONE ENCOUNTER
Pt called stating she has lymphedema and has a skin infection on cheek, occurring since Sunday or Monday. Need to know what to do with it before long weekend.  She mentioned it has spread a bit, used hydrocortisone, that didn't help, pt used bactroban.    Pharmacy=  [facility] on [address] in [place]    Please call pt at [phone]

## 2024-05-24 ENCOUNTER — OFFICE VISIT (OUTPATIENT)
Dept: PRIMARY CARE | Facility: CLINIC | Age: 59
End: 2024-05-24
Payer: COMMERCIAL

## 2024-05-24 VITALS
DIASTOLIC BLOOD PRESSURE: 84 MMHG | TEMPERATURE: 97.5 F | OXYGEN SATURATION: 94 % | HEIGHT: 65 IN | RESPIRATION RATE: 16 BRPM | BODY MASS INDEX: 28.99 KG/M2 | SYSTOLIC BLOOD PRESSURE: 145 MMHG | WEIGHT: 174 LBS | HEART RATE: 100 BPM

## 2024-05-24 DIAGNOSIS — R21 RASH: Primary | ICD-10-CM

## 2024-05-24 PROCEDURE — 1036F TOBACCO NON-USER: CPT | Performed by: INTERNAL MEDICINE

## 2024-05-24 PROCEDURE — 99213 OFFICE O/P EST LOW 20 MIN: CPT | Performed by: INTERNAL MEDICINE

## 2024-05-24 NOTE — PROGRESS NOTES
"Subjective   Patient ID: Miroslava Roman is a 58 y.o. female who presents for Edema (cheeks).    HPI     Here for rash on checks. Started Monday with blotchy red patches on left cheek. States worsened next day. Tried hydrocortisone and did not help and then bacitracin later and seemed to help. Mostly faded but has some blotches on left cheek, right cheek and chest    Nothing new  Mild itching  Unsure if infected  No fevers or chills  No pain    Did have some alcohol over weekend and sun exposure    No sick and no recent illnesses  Review of Systems   All other systems reviewed and are negative.      Objective   /84 (BP Location: Left arm, Patient Position: Sitting, BP Cuff Size: Adult)   Pulse 100   Temp 36.4 °C (97.5 °F)   Resp 16   Ht 1.651 m (5' 5\")   Wt 78.9 kg (174 lb)   SpO2 94%   BMI 28.96 kg/m²     Physical Exam  Constitutional:       Appearance: Normal appearance.   HENT:      Head:      Comments: Slight pink blotchiness on left cheek-2 patches and 1 pn right cheek. Slightly on chest. Very light. Flat. No raised or indurated. No warmth and no drainage, No ulcers     Right Ear: Tympanic membrane normal.      Left Ear: Tympanic membrane normal.      Nose: No congestion.      Mouth/Throat:      Mouth: Mucous membranes are moist.      Pharynx: Oropharynx is clear.   Cardiovascular:      Rate and Rhythm: Normal rate and regular rhythm.      Heart sounds: Normal heart sounds. No murmur heard.     No gallop.   Pulmonary:      Effort: Pulmonary effort is normal. No respiratory distress.      Breath sounds: Normal breath sounds.   Musculoskeletal:      Right lower leg: No edema.      Left lower leg: No edema.   Lymphadenopathy:      Cervical: No cervical adenopathy.   Neurological:      Mental Status: She is alert.         Assessment/Plan   Problem List Items Addressed This Visit    None  Visit Diagnoses         Codes    Rash    -  Primary R21          Rash-unclear  -trial Claritin  -no signs of " infection  -? Rosacea with alcohol, sun exposure- avoid  -if continues-could trial metro gel  -if worsens or changes- take pictures and let us know  -if any ulcers, fevers, or any changes

## 2024-06-03 ENCOUNTER — TREATMENT (OUTPATIENT)
Dept: OCCUPATIONAL THERAPY | Facility: CLINIC | Age: 59
End: 2024-06-03
Payer: COMMERCIAL

## 2024-06-03 DIAGNOSIS — I89.0 LYMPHEDEMA OF FACE: ICD-10-CM

## 2024-06-03 DIAGNOSIS — M62.81 GENERALIZED MUSCLE WEAKNESS: ICD-10-CM

## 2024-06-03 DIAGNOSIS — I89.0 LYMPHEDEMA: Primary | ICD-10-CM

## 2024-06-03 PROCEDURE — 97110 THERAPEUTIC EXERCISES: CPT | Mod: GO,CO

## 2024-06-03 PROCEDURE — 97140 MANUAL THERAPY 1/> REGIONS: CPT | Mod: GO,CO

## 2024-06-03 NOTE — PROGRESS NOTES
Occupational Therapy    Occupational Therapy Treatment    Name: Miroslava Roman  MRN: [id number]  : 1965  Date: 24  Visit #14 of 60 in     Time In 1408p  Time Out 1458p  Total Time 50 mins    Assessment:  Pt appears to tolerate treatment well with no complaints of pain. Pt reports feeling less full in abdomen, jawline after MLD is complete.    Plan:  Continue occupational therapy for modified trunk, abdominal, head and neck, UE lymphedema complete decongestive therapy (CDT), exercises, stretches for decongestion and mobility, pain management, increase optimal function; 1x for every other week. Increase frequency as needed. Modify, upgrade tx/strategies due to pt fluctuating swelling and pain levels. Support voice function       Subjective   Pt states she      Pain Assessment:  Discomfort, mostly in abdomen    Objective    Therapy Diagnosis  1. Lymphedema        2. Lymphedema of face        3. Generalized muscle weakness            Objective       Head and neck edema located in the following area(s): .   Face: face (bilaterally) midface nose upper lip   Cheek: full bilaterally  Jowl:/Jaw line: full bilaterally   Lower Eyelid: puffy bilaterally     Neck, chest full.   Additional Information: Full abdomen, firm throughout  Voice with congestion.    Treatment  50  minutes    Manual Therapy  35  GARCIA provide Manual Lymph Drainage (MLD) to abdomen (paired with diaphragmatic breathing), first step of tx.  Pt had to leave to use bathroom (void), return for further tx , felt less abdominal tightness.   GARCIA clear trunk, hips, abdomen (deep and superficial MLD).  GARCIA clear axilla, upper chest/neck.   GARCIA provide MLD face, jaw, chin, neck, utilize both ant and post neck pathways.  Exercises after MLD.  Good tissue texture softening. Pt reports she feels relief, feels she will have further relief throughout evening.    Ther ex 10  GARCIA instructs pt in trunk clearing exercises using large therapy ball  Prone on  therapy ball, trunk rotation to left, trunk rotation to right  Bird dogs on mat 2x5

## 2024-06-12 ENCOUNTER — APPOINTMENT (OUTPATIENT)
Dept: PULMONOLOGY | Facility: CLINIC | Age: 59
End: 2024-06-12
Payer: COMMERCIAL

## 2024-06-17 ENCOUNTER — APPOINTMENT (OUTPATIENT)
Dept: OCCUPATIONAL THERAPY | Facility: CLINIC | Age: 59
End: 2024-06-17
Payer: COMMERCIAL

## 2024-06-17 DIAGNOSIS — M43.6 NECK STIFFNESS: ICD-10-CM

## 2024-06-17 DIAGNOSIS — I89.0 LYMPHEDEMA: ICD-10-CM

## 2024-06-17 DIAGNOSIS — I97.2 LYMPHEDEMA SYNDROME, POSTMASTECTOMY: Primary | ICD-10-CM

## 2024-06-17 DIAGNOSIS — I89.0 LYMPHEDEMA OF FACE: ICD-10-CM

## 2024-06-17 PROCEDURE — 97110 THERAPEUTIC EXERCISES: CPT | Mod: GO

## 2024-06-17 PROCEDURE — 97140 MANUAL THERAPY 1/> REGIONS: CPT | Mod: GO

## 2024-06-17 NOTE — PROGRESS NOTES
"Occupational Therapy    Occupational Therapy Treatment    Name: Miroslava Roman  MRN: [id number]  : 1965  Date: 24    Time Calculation  Start Time: 1302  Stop Time: 1358  Time Calculation (min): 56 min    Visit #15 of 60 in     Assessment:  Pt report decreased fullness post tx, though still full left face, left axilla. Less fullness abdomen.  Pt voice feeling more clear after tx, less neck swelling.    Plan:  Continue occupational therapy for modified trunk, abdominal, head and neck, UE lymphedema complete decongestive therapy (CDT), exercises, stretches for decongestion and mobility, pain management, increase optimal function; 1x for every other week. Increase frequency as needed. Modify, upgrade tx/strategies due to pt fluctuating swelling and pain levels. Support voice function       Subjective   Pt states she feels under both arms.  Pt reports abdominal fullness does not goo away, \"it goes down\".      Pain Assessment:  Discomfort in jaw area.  Discomfort \"slight\" in abdomen    Objective    Therapy Diagnosis  1. Lymphedema syndrome, postmastectomy        2. Lymphedema        3. Neck stiffness        4. Lymphedema of face            Objective       Head and neck edema located in the following area(s): .   Face: face (bilaterally) midface nose upper lip   Cheek: full bilaterally  Jowl/Jaw line: full bilaterally   Lower Eyelid: puffy bilaterally     Neck, chest full.   Additional Information: Full abdomen, firm throughout  Left face/chin/jaw more full than right.  Left axilla full. Per pt feels tight both axilla.  Voice with congestion.    Treatment  56 minutes    Manual Therapy  41  OT provide Manual Lymph Drainage (MLD) to abdomen (paired with diaphragmatic breathing), first step of tx.    OT provide gentle lower abdomen soft tissue scar massage, pt reports scar feels tight.  OT clear trunk, hips, axilla bilaterally, upper chest, neck.  OT utilize both ant and post neck pathways.  OT provide MLD " face, focus left due to more fullness.  OT provide gentle soft tissue massage techniques with MLD throughout tx.  Exercises with/during MLD.  Good tissue texture softening.   Pt reports she feels relief, feels she will have further relief throughout evening.    Ther ex 15  OT instruct pt in trunk clearing exercises on mat:  bridges x 5, lateral clam shells x5 each side, knees to chest, rotations each direction x10.  Pacing, rest breaks between exercises.  Neck ROM exercises intermittently with MLD.  Arm stretches, shoulder flex, modified circles x5, x2.

## 2024-07-01 ENCOUNTER — APPOINTMENT (OUTPATIENT)
Dept: OCCUPATIONAL THERAPY | Facility: CLINIC | Age: 59
End: 2024-07-01
Payer: COMMERCIAL

## 2024-07-01 DIAGNOSIS — I89.0 LYMPHEDEMA: Primary | ICD-10-CM

## 2024-07-01 DIAGNOSIS — I97.2 LYMPHEDEMA SYNDROME, POSTMASTECTOMY: ICD-10-CM

## 2024-07-01 DIAGNOSIS — M43.6 NECK STIFFNESS: ICD-10-CM

## 2024-07-01 DIAGNOSIS — I89.0 LYMPHEDEMA OF FACE: ICD-10-CM

## 2024-07-01 PROCEDURE — 97110 THERAPEUTIC EXERCISES: CPT | Mod: GO

## 2024-07-01 PROCEDURE — 97140 MANUAL THERAPY 1/> REGIONS: CPT | Mod: GO

## 2024-07-01 NOTE — PROGRESS NOTES
Occupational Therapy    Occupational Therapy Treatment    Name: Miroslava Roman  MRN: [id number]  : 1965  Date: 24    Time Calculation  Start Time: 1305  Stop Time: 1400  Time Calculation (min): 55 min    Visit #16 of 60 in     Assessment:  Softened tissue texture, decreased fullness post tx: face, neck, trunk/abdomen.  Improved voice clarity post tx.  Left thumb/thenar area tight, pt to monitor joint/muscle.    Plan:  Continue occupational therapy for modified trunk, abdominal, head and neck, UE lymphedema complete decongestive therapy (CDT), exercises, stretches for decongestion and mobility, pain management, increase optimal function; 1x for every other week. Increase frequency as needed. Modify, upgrade tx/strategies due to pt fluctuating swelling and pain levels. Support voice function       Subjective   Pt reports fullness.  Pt reports left thumb stiff, decreased .  Pt reports right ear, neck full.  Pt reports fullness face, neck, mild voice congestion  Fullness abdomen, less full axilla though still fluctuating.   Pt states she feels under both arms.     Pain Assessment:  Mild pain, more full ,tight.    Objective    Therapy Diagnosis  1. Lymphedema        2. Lymphedema syndrome, postmastectomy        3. Neck stiffness        4. Lymphedema of face            Objective       Head and neck edema located in the following area(s): .   Face: face (bilaterally) midface nose upper lip   Cheek: full bilaterally  Jowl/Jaw line: full bilaterally   Lower Eyelid: puffy bilaterally     Neck, chest full.   Additional Information: Full abdomen, firm throughout  Left face/chin/jaw more full than right.  Voice with congestion.    Treatment  55 minutes    Manual Therapy  45  OT provide Manual Lymph Drainage (MLD) to abdomen (paired with diaphragmatic breathing), first step of MLD tx.    OT provide gentle lower abdomen soft tissue scar massage, pt reports scar feels tight.  OT clear trunk, hips, axilla  bilaterally, upper chest, neck.  OT utilize both ant and post neck pathways.  OT provide MLD face, focus left due to more fullness.  OT provide gentle soft tissue massage techniques with MLD throughout tx.  Exercises with/during MLD.  Good tissue texture softening.   Pt reports she feels relief, feels she will have further relief throughout evening.    Ther ex 10  OT instruct pt in trunk clearing exercises on mat:  bridges x 5, lateral clam shells x5 each side, knees to chest, rotations each direction x10.  Pacing, rest breaks between exercises.  Neck, mouth ROM exercises intermittently with MLD.

## 2024-07-15 ENCOUNTER — APPOINTMENT (OUTPATIENT)
Dept: OCCUPATIONAL THERAPY | Facility: CLINIC | Age: 59
End: 2024-07-15
Payer: COMMERCIAL

## 2024-07-15 DIAGNOSIS — I89.0 LYMPHEDEMA: Primary | ICD-10-CM

## 2024-07-15 DIAGNOSIS — I97.2 LYMPHEDEMA SYNDROME, POSTMASTECTOMY: ICD-10-CM

## 2024-07-15 DIAGNOSIS — I89.0 LYMPHEDEMA OF FACE: ICD-10-CM

## 2024-07-15 DIAGNOSIS — M43.6 NECK STIFFNESS: ICD-10-CM

## 2024-07-15 PROCEDURE — 97140 MANUAL THERAPY 1/> REGIONS: CPT | Mod: GO

## 2024-07-15 PROCEDURE — 97110 THERAPEUTIC EXERCISES: CPT | Mod: GO

## 2024-07-15 NOTE — PROGRESS NOTES
"Occupational Therapy    Occupational Therapy Treatment    Name: Miroslava Roman  MRN: [id number]  : 1965  Date: 7/15/2024    Time Calculation  Start Time: 1503  Stop Time: 1558  Time Calculation (min): 55 min    Visit #17 of 60 in     Assessment:  Softened tissue texture, decreased fullness post tx: face, neck, trunk/abdomen.  Improved voice clarity post tx.    Plan:  Continue occupational therapy for modified trunk, abdominal, head and neck, UE lymphedema complete decongestive therapy (CDT), exercises, stretches for decongestion and mobility, pain management, increase optimal function; 1x for every other week. Increase frequency as needed. Modify, upgrade tx/strategies due to pt fluctuating swelling and pain levels. Support voice function       Subjective   Pt reports fullness throughout, abdomen, head and neck.  Pt reports good decongestion \"emptying\" face and neck after last session, though now refilled.  Pt reports fullness mild voice congestion.    Pain Assessment:  Mild pain, more fullness, tightness throughout.      Objective    Therapy Diagnosis  1. Lymphedema        2. Lymphedema syndrome, postmastectomy        3. Neck stiffness        4. Lymphedema of face            Objective       Head and neck edema located in the following area(s): .   Face: face (bilaterally) midface nose upper lip   Cheek: full bilaterally  Jowl/Jaw line: full bilaterally   Lower Eyelid: puffy bilaterally     Neck, chest full.   Additional Information: Full abdomen, firm throughout  Left face/chin/jaw more full than right.  Voice with congestion.    Treatment  55 minutes    Manual Therapy  45  OT provide Manual Lymph Drainage (MLD) to abdomen (paired with diaphragmatic breathing), first step of MLD tx.    OT provide gentle lower abdomen soft tissue scar massage, pt reports scar feels tight.  OT clear trunk, hips, axilla bilaterally, upper chest, neck.  OT utilize both ant and post neck pathways.  OT provide MLD face, focus " "left due to more fullness.  OT provide gentle soft tissue massage techniques with MLD throughout tx.  Exercises with/during MLD.  Good tissue texture softening.   Pt reports less fullness, improved \"draining\" post tx.    Ther ex 10  OT instruct pt in trunk clearing exercises on mat:  bridges x 5, lateral clam shells x5 each side, knees to chest, rotations each direction x10.  Side lying mat with IP band stretches, trunk stretches.  Pacing, rest breaks between exercises.          "

## 2024-07-29 ENCOUNTER — APPOINTMENT (OUTPATIENT)
Dept: OCCUPATIONAL THERAPY | Facility: CLINIC | Age: 59
End: 2024-07-29
Payer: COMMERCIAL

## 2024-07-29 DIAGNOSIS — I97.2 LYMPHEDEMA SYNDROME, POSTMASTECTOMY: ICD-10-CM

## 2024-07-29 DIAGNOSIS — I89.0 LYMPHEDEMA OF FACE: ICD-10-CM

## 2024-07-29 DIAGNOSIS — I89.0 LYMPHEDEMA: Primary | ICD-10-CM

## 2024-07-29 DIAGNOSIS — M43.6 NECK STIFFNESS: ICD-10-CM

## 2024-07-29 PROCEDURE — 97535 SELF CARE MNGMENT TRAINING: CPT | Mod: GO

## 2024-07-29 PROCEDURE — 97140 MANUAL THERAPY 1/> REGIONS: CPT | Mod: GO

## 2024-07-29 ASSESSMENT — ACTIVITIES OF DAILY LIVING (ADL): HOME_MANAGEMENT_TIME_ENTRY: 10

## 2024-07-29 NOTE — PROGRESS NOTES
"Occupational Therapy    Occupational Therapy Treatment    Name: Miroslava Roman  MRN: [id number]  : 1965  Date: 24    Time Entry:  Time Calculation  Start Time: 1301  Stop Time: 1355  Time Calculation (min): 54 min        OT Therapeutic Procedures Time Entry  Manual Therapy Time Entry: 44  Self Care/Home Management (ADLs) Time Entry: 10                Visit #17 of 60 in     Assessment:  Pt face, neck full. Abdomen full. Voice congested.   Pt tolerated tx well.   Softened tissue texture post tx.  Voice clarity improved.    Plan:  Continue occupational therapy for modified trunk, abdominal, head and neck, UE lymphedema complete decongestive therapy (CDT), exercises, stretches for decongestion and mobility, pain management, increase optimal function; 1x for every other week. Increase frequency as needed. Modify, upgrade tx/strategies due to pt fluctuating swelling and pain levels. Support voice function       Subjective   Pt reports very full, face, neck, tops of shoulders, abdomen, hips.  \"Moving is just hard.\"  Pt was busy over weekend 80+ people at house. Pt tired, swollen from this busy activity, \"doing a lot of work\".  Pt reports moderate voice congestion.    Pain Assessment:  Uncomfortable throughout, impacts movement.\"      Objective    Therapy Diagnosis  1. Lymphedema        2. Lymphedema syndrome, postmastectomy        3. Neck stiffness        4. Lymphedema of face              Objective       Head and neck edema located in the following area(s): .   Face: face (bilaterally) midface nose upper lip   Cheek: full bilaterally  Jowl/Jaw line: full bilaterally   Lower Eyelid: puffy bilaterally     Neck, chest full.   Additional Information: Full abdomen, firm throughout  Left face/chin/jaw more full than right.  Voice with congestion.    Treatment  54 minutes    Manual Therapy  44  OT provide Manual Lymph Drainage (MLD) to abdomen, superficial and deep techniques, paired with diaphragmatic " "breathing).    OT provide gentle lower abdomen soft tissue scar massage.  OT clear trunk, hips, axilla bilaterally, upper chest/shoulders, neck, face.  OT utilize both ant and post neck drainage pathways.  OT provide gentle soft tissue massage techniques with MLD throughout tx.  Exercises with/during MLD.  Good tissue texture softening.   Pt reports  improved \"draining\" post tx.    Ther ex 10  OT instruct pt in trunk clearing exercises on mat:  bridges x 5, lateral clam shells x5 each side, knees to chest, rotations each direction x10.  Pacing, rest breaks between exercises.            "

## 2024-08-12 ENCOUNTER — APPOINTMENT (OUTPATIENT)
Dept: OCCUPATIONAL THERAPY | Facility: CLINIC | Age: 59
End: 2024-08-12
Payer: COMMERCIAL

## 2024-08-19 ENCOUNTER — APPOINTMENT (OUTPATIENT)
Dept: OCCUPATIONAL THERAPY | Facility: CLINIC | Age: 59
End: 2024-08-19
Payer: COMMERCIAL

## 2024-08-19 DIAGNOSIS — I89.0 LYMPHEDEMA: ICD-10-CM

## 2024-08-19 DIAGNOSIS — M43.6 NECK STIFFNESS: ICD-10-CM

## 2024-08-19 DIAGNOSIS — I89.0 LYMPHEDEMA OF FACE: Primary | ICD-10-CM

## 2024-08-19 DIAGNOSIS — M62.81 GENERALIZED MUSCLE WEAKNESS: ICD-10-CM

## 2024-08-19 PROCEDURE — 97535 SELF CARE MNGMENT TRAINING: CPT | Mod: GO,CO

## 2024-08-19 PROCEDURE — 97110 THERAPEUTIC EXERCISES: CPT | Mod: GO,CO

## 2024-08-19 ASSESSMENT — ACTIVITIES OF DAILY LIVING (ADL): HOME_MANAGEMENT_TIME_ENTRY: 45

## 2024-08-19 NOTE — PROGRESS NOTES
"Occupational Therapy    Occupational Therapy Treatment    Name: Miroslava Roman  MRN: [id number]  : 1965  Date: 24    Time Entry:  Time In 1505p  Time Out 1600p  Total Time 55 mins    Visit #18 of 60 in     Assessment:  Pt appears to tolerate treatment well with no complaints of pain. Good tissue texture softening post MLD. Pt continues home program.    Plan:  Continue occupational therapy for modified trunk, abdominal, head and neck, UE lymphedema complete decongestive therapy (CDT), exercises, stretches for decongestion and mobility, pain management, increase optimal function; 1x for every other week. Increase frequency as needed. Modify, upgrade tx/strategies due to pt fluctuating swelling and pain levels. Support voice function       Subjective   Pt states she has been doing well, pt feels she is \"even keel\" with swelling but feels belly is not clearing fully as well as neck.    Pain Assessment:  Discomfort in abdomen, neck, shoulders      Objective    Therapy Diagnosis  1. Lymphedema of face        2. Lymphedema        3. Generalized muscle weakness        4. Neck stiffness            Objective       Head and neck edema located in the following area(s): .   Face: face (bilaterally) midface nose upper lip   Cheek: full bilaterally  Jowl/Jaw line: full bilaterally   Lower Eyelid: puffy bilaterally     Neck, chest full.   Additional Information: Full abdomen, firm throughout  Left face/chin/jaw more full than right.  Voice with congestion.    Treatment  55 minutes    Manual Therapy  45  GARCIA provide Manual Lymph Drainage to abdomen, superficial and deep techniques, paired with diaphragmatic breathing).    GARCIA clear trunk, hips, axilla bilaterally, upper chest/shoulders, neck, face.  GARCIA utilize both ant and post neck drainage pathways.  GARCIA provide gentle soft tissue massage techniques with MLD throughout tx.    Good tissue texture softening.   Pt reports  improved \"draining\" post tx.    Ther ex " 10  GARCIA instruct pt in cervical ROM exercises  1x3 each side, neck side bends  2x5 chin tucks  2x5 B UE shoulder flexion overhead   Doorway stretch

## 2024-09-03 ENCOUNTER — APPOINTMENT (OUTPATIENT)
Dept: OCCUPATIONAL THERAPY | Facility: CLINIC | Age: 59
End: 2024-09-03
Payer: COMMERCIAL

## 2024-09-03 DIAGNOSIS — I89.0 LYMPHEDEMA: ICD-10-CM

## 2024-09-03 DIAGNOSIS — I97.2 LYMPHEDEMA SYNDROME, POSTMASTECTOMY: ICD-10-CM

## 2024-09-03 DIAGNOSIS — I89.0 LYMPHEDEMA OF FACE: Primary | ICD-10-CM

## 2024-09-03 DIAGNOSIS — M43.6 NECK STIFFNESS: ICD-10-CM

## 2024-09-03 PROCEDURE — 97140 MANUAL THERAPY 1/> REGIONS: CPT | Mod: GO | Performed by: OCCUPATIONAL THERAPIST

## 2024-09-03 NOTE — PROGRESS NOTES
Occupational Therapy    OT Treatment      Name: Miroslava Roman  MRN: [id number]  : 1965  Date: 24    Time Entry:  Time In 908  Time Out 1002  Total Time 54 mins    Visit #19 of 60 in     Assessment:  Patient with noted decreased lymphatic fullness throughout abdomen and head/neck post manual lymph drainage treatment.     Plan:  Continue occupational therapy for modified trunk, abdominal, head and neck, UE lymphedema complete decongestive therapy (CDT), exercises, stretches for decongestion and mobility, pain management, increase optimal function; 1x for every other week. Increase frequency as needed. Modify, upgrade tx/strategies due to pt fluctuating swelling and pain levels. Support voice function       Subjective   Patient reports she is more full and hoarse after having a family picnic yesterday.     Pain Assessment:  Discomfort in abdomen, neck, shoulders      Objective    Therapy Diagnosis  1. Lymphedema of face        2. Lymphedema syndrome, postmastectomy        3. Neck stiffness        4. Lymphedema              Objective       Head and neck edema located in the following area(s): .   Face: face (bilaterally) midface nose upper lip   Cheek: full bilaterally  Jowl/Jaw line: full bilaterally   Lower Eyelid: puffy bilaterally     Neck, chest full.   Additional Information: Full abdomen, firm throughout  Left face/chin/jaw more full than right.  Voice with congestion.    Treatment  55 minutes    Manual Therapy  45    OT provided manual lymph drainage (MLD) to abdomen paired with diaphragmatic breathing, across pelvis, to chest, neck and face.    -decreased fullness post treatment

## 2024-09-16 ENCOUNTER — APPOINTMENT (OUTPATIENT)
Dept: OCCUPATIONAL THERAPY | Facility: CLINIC | Age: 59
End: 2024-09-16
Payer: COMMERCIAL

## 2024-09-16 DIAGNOSIS — I89.0 LYMPHEDEMA: Primary | ICD-10-CM

## 2024-09-16 DIAGNOSIS — I97.2 LYMPHEDEMA SYNDROME, POSTMASTECTOMY: ICD-10-CM

## 2024-09-16 DIAGNOSIS — I89.0 LYMPHEDEMA OF FACE: ICD-10-CM

## 2024-09-16 DIAGNOSIS — M43.6 NECK STIFFNESS: ICD-10-CM

## 2024-09-16 PROCEDURE — 97140 MANUAL THERAPY 1/> REGIONS: CPT | Mod: GO

## 2024-09-16 NOTE — PROGRESS NOTES
Occupational Therapy    OT Treatment      Name: Miroslava Roman  MRN: [id number]  : 1965  Date: 24    Time Entry:  Time In 1501  Time Out 1547  Total Time 46 minutes    Visit #20 of 60 in     Assessment:  Patient with noted decreased lymphatic fullness throughout abdomen and head/neck post manual lymph drainage treatment.  Pt voice stronger, louder after tx.  Added general MLD left leg, ankle, foot. Good tissue texture softening, decreased pain with tx.    Plan:  Continue occupational therapy for modified trunk, abdominal, head and neck, UE lymphedema complete decongestive therapy (CDT), exercises, stretches for decongestion and mobility, pain management, increase optimal function; 1x for every other week. Increase frequency as needed. Modify, upgrade tx/strategies due to pt fluctuating swelling and pain levels. Support voice function       Subjective   Patient notes left foot, distal lower leg swelling, pain; she saw ortho due to shooting pain (Dr Ogden). Fx ruled out, foot wrapped with Coban.    Abdomen full, firm.  Throat full, swollen, voice is congested.     Pain Assessment:  Sore abdomen, throat.  Sore left foot and toes (fluctuates).    Objective    Therapy Diagnosis  1. Lymphedema        2. Lymphedema syndrome, postmastectomy        3. Neck stiffness        4. Lymphedema of face              Objective       Head and neck edema located in the following area(s): .   Face: face (bilaterally) midface nose upper lip   Cheek: full bilaterally  Jowl/Jaw line: full bilaterally   Lower Eyelid: puffy bilaterally     Neck, chest full.   Additional Information: Full abdomen, firm throughout  Left face/chin/jaw more full than right.  Voice with congestion.  Left ankle, foot full, (tops of foot, toes, lat distal lower leg. Color good.    Treatment  46 minutes    Manual Therapy  46    OT provided manual lymph drainage (MLD) to abdomen paired with diaphragmatic breathing both deep and superficial clearing,  abdominal gentle scar massage.  OT provide MLD trunk, chest/clavicles, neck and face.    Gentle soft tissue techniques.  Pt perform trunk/hip stretches with MLD, then OT repeat above sequence.  OT instruct, add left inguinal clearing,left leg, distal leg, ankle, foot MLD and soft tissue techniques.  Pt note less tightness, decreased pain with foot movement after tx.  Pt to ad to her home routine.

## 2024-09-27 ENCOUNTER — APPOINTMENT (OUTPATIENT)
Dept: PULMONOLOGY | Facility: CLINIC | Age: 59
End: 2024-09-27
Payer: COMMERCIAL

## 2024-09-30 ENCOUNTER — APPOINTMENT (OUTPATIENT)
Dept: OCCUPATIONAL THERAPY | Facility: CLINIC | Age: 59
End: 2024-09-30
Payer: COMMERCIAL

## 2024-09-30 DIAGNOSIS — I89.0 LYMPHEDEMA OF FACE: ICD-10-CM

## 2024-09-30 DIAGNOSIS — I97.2 LYMPHEDEMA SYNDROME, POSTMASTECTOMY: ICD-10-CM

## 2024-09-30 DIAGNOSIS — M62.81 GENERALIZED MUSCLE WEAKNESS: ICD-10-CM

## 2024-09-30 DIAGNOSIS — I89.0 LYMPHEDEMA: Primary | ICD-10-CM

## 2024-09-30 PROCEDURE — 97140 MANUAL THERAPY 1/> REGIONS: CPT | Mod: GO,CO

## 2024-09-30 PROCEDURE — 97110 THERAPEUTIC EXERCISES: CPT | Mod: GO,CO

## 2024-09-30 NOTE — PROGRESS NOTES
"Occupational Therapy    OT Treatment      Name: Miroslava Roman  MRN: [id number]  : 1965  Date: 24    Time Entry:  Time In 1522  Time Out 1617  Total Time 55 minutes    Visit #21 of 60 in     Assessment:  Pt appears to tolerate treatment well with no complaints of pain. Good tissue texture softening post MLD. Decreased tightness left ankle/L LE post MLD. GARCIA instructs pt in stretching techniques with good follow through.    Plan:  Continue occupational therapy for modified trunk, abdominal, head and neck, UE lymphedema complete decongestive therapy (CDT), exercises, stretches for decongestion and mobility, pain management, increase optimal function; 1x for every other week. Increase frequency as needed. Modify, upgrade tx/strategies due to pt fluctuating swelling and pain levels. Support voice function       Subjective   Patient states she feels her abdomen is full, left foot and leg.     Pain Assessment:  \"Some movement pain but I am much better off then I was two weeks ago\"    Objective    Therapy Diagnosis  1. Lymphedema        2. Lymphedema syndrome, postmastectomy        3. Lymphedema of face        4. Generalized muscle weakness            Objective       Head and neck edema located in the following area(s): .   Face: face (bilaterally) midface nose upper lip   Cheek: full bilaterally  Jowl/Jaw line: full bilaterally   Lower Eyelid: puffy bilaterally     Neck, chest full.   Additional Information: Full abdomen, firm throughout  Left face/chin/jaw more full than right.  Voice with congestion.  Left ankle, foot full, (tops of foot, toes, lat distal lower leg. Color good.    Treatment  55 minutes    Manual Therapy  45    GARCIA provided manual lymph drainage (MLD) to abdomen paired with diaphragmatic breathing both deep and superficial clearing, abdominal gentle scar massage.  GARCIA provides MLD to L LE, good tissue texture softening post MLD.  GARCIA provide MLD trunk, chest/clavicles, neck and face.  "   Gentle soft tissue techniques.      Ther Ex 10  Calf stretch on teal wedge hold for 30 seconds  Knees to chest stretch  Hip flexor stretch in quadriped on mat 1x1, 20-30 second hold   Towel scrunch with both feet/toes

## 2024-10-08 ENCOUNTER — APPOINTMENT (OUTPATIENT)
Dept: OCCUPATIONAL THERAPY | Facility: CLINIC | Age: 59
End: 2024-10-08
Payer: COMMERCIAL

## 2024-11-01 DIAGNOSIS — J45.20 MILD INTERMITTENT ASTHMA WITHOUT COMPLICATION (HHS-HCC): ICD-10-CM

## 2024-11-01 RX ORDER — ALBUTEROL SULFATE 90 UG/1
1-2 INHALANT RESPIRATORY (INHALATION) AS NEEDED
Qty: 6.7 G | Refills: 1 | Status: SHIPPED | OUTPATIENT
Start: 2024-11-01

## 2024-11-05 ENCOUNTER — APPOINTMENT (OUTPATIENT)
Dept: OCCUPATIONAL THERAPY | Facility: CLINIC | Age: 59
End: 2024-11-05
Payer: COMMERCIAL

## 2024-11-05 DIAGNOSIS — I89.0 LYMPHEDEMA OF FACE: ICD-10-CM

## 2024-11-05 DIAGNOSIS — I89.0 LYMPHEDEMA: ICD-10-CM

## 2024-11-05 DIAGNOSIS — M62.81 GENERALIZED MUSCLE WEAKNESS: ICD-10-CM

## 2024-11-05 DIAGNOSIS — I97.2 LYMPHEDEMA SYNDROME, POSTMASTECTOMY: Primary | ICD-10-CM

## 2024-11-05 PROCEDURE — 97140 MANUAL THERAPY 1/> REGIONS: CPT | Mod: GP

## 2024-11-05 NOTE — PROGRESS NOTES
"Occupational Therapy    OT Treatment      Name: Miroslava Roman  MRN: [id number]  : 1965  Date: 24    Time Entry:  Time In 1200  Time Out 1258  Total Time 58 minutes    Visit #22 of 60 in     Assessment:  Pt appears to tolerate treatment well with no complaints of pain. Good tissue texture softening post MLD, notably in abdomen.     Plan:  Continue occupational therapy for modified trunk, abdominal, head and neck, UE lymphedema complete decongestive therapy (CDT), exercises, stretches for decongestion and mobility, pain management, increase optimal function; 1x for every other week. Increase frequency as needed. Modify, upgrade tx/strategies due to pt fluctuating swelling and pain levels. Support voice function       Subjective   Pt states her legs are a little stiff but not terrible. Pt states \"my belly is decently full\".    Pain Assessment:  \"I have a little bit of groin pain\".    Objective    Therapy Diagnosis  1. Lymphedema syndrome, postmastectomy        2. Generalized muscle weakness        3. Lymphedema of face        4. Lymphedema                Objective       Head and neck edema located in the following area(s): .   Face: face (bilaterally) midface nose upper lip   Cheek: full bilaterally  Jowl/Jaw line: full bilaterally   Lower Eyelid: puffy bilaterally     Neck, chest full.   Additional Information: Full abdomen, firm throughout  Left face/chin/jaw more full than right.  Voice with congestion.  Left ankle, foot full, (tops of foot, toes, lat distal lower leg. Color good.    Treatment  58 minutes    Manual Therapy  58    GARCIA provided manual lymph drainage (MLD) to abdomen paired with diaphragmatic breathing both deep and superficial clearing, abdominal gentle scar massage.  GARCIA provides MLD to L LE, good tissue texture softening post MLD.  GARCIA provide MLD trunk, chest/clavicles, neck and face.    Gentle soft tissue techniques.        "

## 2024-11-18 ENCOUNTER — APPOINTMENT (OUTPATIENT)
Dept: OCCUPATIONAL THERAPY | Facility: CLINIC | Age: 59
End: 2024-11-18
Payer: COMMERCIAL

## 2024-11-18 DIAGNOSIS — M43.6 NECK STIFFNESS: ICD-10-CM

## 2024-11-18 DIAGNOSIS — I89.0 LYMPHEDEMA OF FACE: ICD-10-CM

## 2024-11-18 DIAGNOSIS — I89.0 LYMPHEDEMA: Primary | ICD-10-CM

## 2024-11-18 DIAGNOSIS — I97.2 LYMPHEDEMA SYNDROME, POSTMASTECTOMY: ICD-10-CM

## 2024-11-18 PROCEDURE — 97110 THERAPEUTIC EXERCISES: CPT | Mod: GO

## 2024-11-18 PROCEDURE — 97140 MANUAL THERAPY 1/> REGIONS: CPT | Mod: GO

## 2024-11-18 NOTE — PROGRESS NOTES
Occupational Therapy    Occupational Therapy Treatment    Name: Miroslava Roman  MRN: [id number]  : 1965  Date: 24    Time Entry:  Time Calculation  Start Time: 845  Stop Time: 928  Time Calculation (min): 43 min        OT Therapeutic Procedures Time Entry  Manual Therapy Time Entry: 28  Therapeutic Exercise Time Entry: 15              Visit #23 of 60 in     Assessment:  Pt reported tightness in trunk, abdomen and back, face fullness.  Pt noted feeling relief after tx.  Softened tissue texture noted with tx.  Upgraded exercises and stretches added, good pt tolerance, less tightness reported.    Plan:  Continue occupational therapy for modified trunk, abdominal, head and neck, UE lymphedema complete decongestive therapy (CDT), exercises, stretches for decongestion and mobility, pain management, increase optimal function; 1x for every other week. Increase frequency as needed. Modify, upgrade tx/strategies due to pt fluctuating swelling and pain levels. Support voice function    Subjective   Pt reports her back feels tight.  Abdomen full.    Pain Assessment:  Discomfort/tight groin scar, face.    Objective    Therapy Diagnosis  1. Lymphedema        2. Lymphedema of face        3. Lymphedema syndrome, postmastectomy        4. Neck stiffness                Objective       Head and neck edema located in the following area(s): .   Face: face (bilaterally) midface nose upper lip   Cheek: full bilaterally  Jowl/Jaw line: full bilaterally   Lower Eyelid: puffy bilaterally     Neck, chest full.   Additional Information: Full abdomen, firm throughout  Tight back.  Left face/chin/jaw full.  Voice with congestion.    Treatment  43 minutes    Manual Therapy  28    OT provide manual lymph drainage (MLD) to abdomen paired with diaphragmatic breathing both deep and superficial clearing, abdominal gentle scar massage.  OT provide MLD to clear trunk, to shoulders, traps, upper chest, MLD to neck and face.  OT pain neck  stretches/ROM with MLD.  Tissue texture softening noted with tx.  Pt noted less trunk/abdomen/back tightness after tx.    Ther Ex 15  Neck ROM, rotation, side bend  Bridges x5 x2  While OT stretch neck in pt supine, pt rotate knees to each side slowly for good trunk stretch  Pt side lying; OT facilitate alternating stretches, shoulders forward with hips rolled back, facilitate side stretches/ disassociation of shoulders and hips, 5 x each side.   Shoulder rolls, scap glides with shoulders round forward, alternate with squeezing shoulder blades together  5 reps x2  Slow pace.  Pt reported feeling less tight.  Pt to add further stretches later at home.

## 2024-12-02 ENCOUNTER — APPOINTMENT (OUTPATIENT)
Dept: OCCUPATIONAL THERAPY | Facility: CLINIC | Age: 59
End: 2024-12-02
Payer: COMMERCIAL

## 2024-12-02 DIAGNOSIS — I89.0 LYMPHEDEMA: Primary | ICD-10-CM

## 2024-12-02 DIAGNOSIS — I97.2 LYMPHEDEMA SYNDROME, POSTMASTECTOMY: ICD-10-CM

## 2024-12-02 DIAGNOSIS — I89.0 LYMPHEDEMA OF FACE: ICD-10-CM

## 2024-12-02 DIAGNOSIS — M43.6 NECK STIFFNESS: ICD-10-CM

## 2024-12-02 PROCEDURE — 97110 THERAPEUTIC EXERCISES: CPT | Mod: GO

## 2024-12-02 PROCEDURE — 97140 MANUAL THERAPY 1/> REGIONS: CPT | Mod: GO

## 2024-12-02 NOTE — PROGRESS NOTES
"Occupational Therapy    Occupational Therapy Treatment    Name: Miroslava Roman  MRN: [id number]  : 1965  Date: 24    Time Entry:  Time Calculation  Start Time: 1500  Stop Time: 1556  Time Calculation (min): 56 min        OT Therapeutic Procedures Time Entry  Manual Therapy Time Entry: 40  Therapeutic Exercise Time Entry: 16              Visit #24 of 60 in     Assessment:  Pt reported tightness in abdomen, neck/chest, face fullness.  Tissue texture softening post MLD tx and stretches/exercises.  HEP refined.    Plan:  Continue occupational therapy for modified trunk, abdominal, head and neck, UE lymphedema complete decongestive therapy (CDT), exercises, stretches for decongestion and mobility, pain management, increase optimal function; 1x for every other week. Increase frequency as needed. Modify, upgrade tx/strategies due to pt fluctuating swelling and pain levels. Support voice function    Subjective   Pt reports face, eyes, throat and back of neck full.  Pt reports her back feels less full and tight since last session.  Abdomen full, pt reports more constant, \"not emptying\". Full after meals, tight.    Pain Assessment:  Discomfort/tight abdomen, neck, face.    Objective    Therapy Diagnosis  1. Lymphedema        2. Lymphedema of face        3. Lymphedema syndrome, postmastectomy        4. Neck stiffness                Objective       Head and neck edema located in the following area(s): .   Face: face (bilaterally) midface nose upper lip   Cheek: full bilaterally  Jowl/Jaw line: full bilaterally   Lower Eyelid: puffy bilaterally     Neck, chest full.   Additional Information: Full abdomen, firm throughout  Face/chin/jaw full.  Voice with congestion.    Treatment  56 minutes    Manual Therapy  40    OT provide manual lymph drainage (MLD) to abdomen (deep, superficial sequences), abdominal gentle scar massage.    OT provide MLD to clear trunk, to shoulders, up chest, traps, MLD to neck and face.  OT " pair neck stretches/ROM with MLD.  Repeat abdominal clearing.  Tissue texture softening noted with tx.    Ther Ex 16  Neck ROM, rotation, side bend  Bridges x5 x2, cue alignment, technique.  Supine, side lying trunk and hip ROM, varied planes.  Prone on mat, on knees, arm forward for lat stretches. Pt noted feeling very restricted, tight;  5 reps each to pt tolerance.  This added to home program.  Shoulder rolls, scap glides with shoulders round forward, alternate with squeezing shoulder blades together  5 reps x2  Slow pace.  Pt reported feeling less tight.

## 2024-12-16 ENCOUNTER — TREATMENT (OUTPATIENT)
Dept: OCCUPATIONAL THERAPY | Facility: CLINIC | Age: 59
End: 2024-12-16
Payer: COMMERCIAL

## 2024-12-16 ENCOUNTER — APPOINTMENT (OUTPATIENT)
Dept: OCCUPATIONAL THERAPY | Facility: CLINIC | Age: 59
End: 2024-12-16
Payer: COMMERCIAL

## 2024-12-16 DIAGNOSIS — M43.6 NECK STIFFNESS: ICD-10-CM

## 2024-12-16 DIAGNOSIS — I97.2 LYMPHEDEMA SYNDROME, POSTMASTECTOMY: ICD-10-CM

## 2024-12-16 DIAGNOSIS — I89.0 LYMPHEDEMA: Primary | ICD-10-CM

## 2024-12-16 DIAGNOSIS — I89.0 LYMPHEDEMA OF FACE: ICD-10-CM

## 2024-12-16 PROCEDURE — 97140 MANUAL THERAPY 1/> REGIONS: CPT | Mod: GO | Performed by: OCCUPATIONAL THERAPIST

## 2024-12-16 NOTE — PROGRESS NOTES
Occupational Therapy    Occupational Therapy Treatment    Name: Miroslava Roman  MRN: [id number]  : 1965  Date: 24    Time Entry:        1. Lymphedema        2. Lymphedema of face        3. Lymphedema syndrome, postmastectomy        4. Neck stiffness            Assessment:  Patient demonstrated decreased fullness throughout head/neck, abdomen and RUE post treatment     Plan:  Continue occupational therapy for modified trunk, abdominal, head and neck, UE lymphedema complete decongestive therapy (CDT), exercises, stretches for decongestion and mobility, pain management, increase optimal function; 1x for every other week. Increase frequency as needed. Modify, upgrade tx/strategies due to pt fluctuating swelling and pain levels. Support voice function    Subjective   Patient reports she is full in her neck today    Pain Assessment:  Discomfort/tight abdomen, neck, face.    Objective    Therapy Diagnosis  1. Lymphedema        2. Lymphedema of face        3. Lymphedema syndrome, postmastectomy        4. Neck stiffness              Objective       Head and neck edema located in the following area(s): .   Face: face (bilaterally) midface nose upper lip   Cheek: full bilaterally  Jowl/Jaw line: full bilaterally   Lower Eyelid: puffy bilaterally     Neck, chest full.   Additional Information: Full abdomen, firm throughout  Face/chin/jaw full.  Voice with congestion.    Treatment  54 minutes    Manual Therapy 54    OT provide manual lymph drainage (MLD) to abdomen (paired with diaphragmatic breathing), to RUE, to neck (anterior and posterior) and face.    -Pt with noted decreased fullness post treatment

## 2024-12-18 ENCOUNTER — TELEPHONE (OUTPATIENT)
Dept: PRIMARY CARE | Facility: CLINIC | Age: 59
End: 2024-12-18
Payer: COMMERCIAL

## 2024-12-19 NOTE — TELEPHONE ENCOUNTER
Nadeen called - stated they need a new order for CT scan that she never did last year - and to fax over to [facility]

## 2024-12-23 ENCOUNTER — TELEMEDICINE (OUTPATIENT)
Dept: PRIMARY CARE | Facility: CLINIC | Age: 59
End: 2024-12-23
Payer: COMMERCIAL

## 2024-12-23 DIAGNOSIS — J06.9 UPPER RESPIRATORY TRACT INFECTION, UNSPECIFIED TYPE: Primary | ICD-10-CM

## 2024-12-23 PROCEDURE — 1036F TOBACCO NON-USER: CPT | Performed by: INTERNAL MEDICINE

## 2024-12-23 PROCEDURE — 99213 OFFICE O/P EST LOW 20 MIN: CPT | Performed by: INTERNAL MEDICINE

## 2024-12-23 RX ORDER — BENZONATATE 200 MG/1
200 CAPSULE ORAL 3 TIMES DAILY PRN
Qty: 42 CAPSULE | Refills: 0 | Status: SHIPPED | OUTPATIENT
Start: 2024-12-23 | End: 2025-01-22

## 2024-12-23 RX ORDER — AZITHROMYCIN 250 MG/1
TABLET, FILM COATED ORAL
Qty: 6 TABLET | Refills: 0 | Status: SHIPPED | OUTPATIENT
Start: 2024-12-23 | End: 2024-12-28

## 2024-12-23 NOTE — PROGRESS NOTES
Subjective   Patient ID: Miroslava Roman is a 59 y.o. female who presents for Cough and Fever (LOWER GRADE FEVER. ).  HPI  Virtual or Telephone Consent    An interactive audio and video telecommunication system which permits real time communications between the patient (at the originating site) and provider (at the distant site) was utilized to provide this telehealth service.   Verbal consent was requested and obtained from Miroslava Roman on this date, 12/23/24 for a telehealth visit.      C/o mostly dry cough and low grade tactile fever for 24 hours  Both daughters in their 20s had pertussis last week - test positive  Now she is sick  So sinus drainage  No sore throat    Review of Systems    Objective   There were no vitals filed for this visit.  Physical Exam  Mildly ill appearing    Assessment & Plan  Upper respiratory tract infection, unspecified type  ? Pertussis given sick contacts  Orders:    azithromycin (Zithromax) 250 mg tablet; Take 2 tablets (500 mg) by mouth once daily for 1 day, THEN 1 tablet (250 mg) once daily for 4 days. Take 2 tabs (500 mg) by mouth today, than 1 daily for 4 days..    benzonatate (Tessalon) 200 mg capsule; Take 1 capsule (200 mg) by mouth 3 times a day as needed for cough. Do not crush or chew.    Call back in 1-2 weeks if not improving

## 2024-12-30 ENCOUNTER — APPOINTMENT (OUTPATIENT)
Dept: OCCUPATIONAL THERAPY | Facility: CLINIC | Age: 59
End: 2024-12-30
Payer: COMMERCIAL

## 2025-01-02 ENCOUNTER — APPOINTMENT (OUTPATIENT)
Dept: OCCUPATIONAL THERAPY | Facility: CLINIC | Age: 60
End: 2025-01-02
Payer: COMMERCIAL

## 2025-01-02 DIAGNOSIS — I97.2 LYMPHEDEMA SYNDROME, POSTMASTECTOMY: Primary | ICD-10-CM

## 2025-01-02 DIAGNOSIS — I89.0 LYMPHEDEMA: ICD-10-CM

## 2025-01-02 DIAGNOSIS — I89.0 LYMPHEDEMA OF FACE: ICD-10-CM

## 2025-01-02 PROCEDURE — 97140 MANUAL THERAPY 1/> REGIONS: CPT | Mod: GO,CO

## 2025-01-02 NOTE — PROGRESS NOTES
Occupational Therapy    Occupational Therapy Treatment    Name: Miroslava Roman  MRN: [id number]  : 1965  Date: 25    Time Entry:  Time Calculation  Start Time: 1100  Stop Time: 1158  Time Calculation (min): 58 min     1. Lymphedema syndrome, postmastectomy        2. Lymphedema of face        3. Lymphedema            Assessment:  Patient demonstrated decreased fullness throughout head/neck, abdomen and RUE post treatment     Plan:  Continue occupational therapy for modified trunk, abdominal, head and neck, UE lymphedema complete decongestive therapy (CDT), exercises, stretches for decongestion and mobility, pain management, increase optimal function; 1x for every other week. Increase frequency as needed. Modify, upgrade tx/strategies due to pt fluctuating swelling and pain levels. Support voice function    Subjective   Patient reports she has been working really hard to keep fluid down, pt states she was sick with whooping cough she contracted from daughters who were home from college.    Pain Assessment:  Discomfort/tight abdomen, neck, face.    Objective    Therapy Diagnosis  1. Lymphedema syndrome, postmastectomy        2. Lymphedema of face        3. Lymphedema              Objective       Head and neck edema located in the following area(s): .   Face: face (bilaterally) midface nose upper lip   Cheek: full bilaterally  Jowl/Jaw line: full bilaterally   Lower Eyelid: puffy bilaterally     Neck, chest full.   Additional Information: Full abdomen, firm throughout  Face/chin/jaw full.  Voice with congestion.    Treatment  58 minutes    Manual Therapy 58    GARCIA provide manual lymph drainage (MLD) to abdomen (paired with diaphragmatic breathing), B shoulders/traps, to neck (anterior and posterior) and face.  -soft tissue mobilization to B shoulders/neck supported with Graston tools  -GARCIA instructs pt in resisted jaw closing techniques to promote lymphatic flow of cheeks/jaw  -Pt with noted decreased  fullness post treatment

## 2025-01-13 ENCOUNTER — APPOINTMENT (OUTPATIENT)
Dept: OCCUPATIONAL THERAPY | Facility: CLINIC | Age: 60
End: 2025-01-13
Payer: COMMERCIAL

## 2025-01-13 DIAGNOSIS — M43.6 NECK STIFFNESS: ICD-10-CM

## 2025-01-13 DIAGNOSIS — I89.0 LYMPHEDEMA: ICD-10-CM

## 2025-01-13 DIAGNOSIS — I89.0 LYMPHEDEMA OF FACE: ICD-10-CM

## 2025-01-13 DIAGNOSIS — I97.2 LYMPHEDEMA SYNDROME, POSTMASTECTOMY: Primary | ICD-10-CM

## 2025-01-13 PROCEDURE — 97110 THERAPEUTIC EXERCISES: CPT | Mod: GO

## 2025-01-13 PROCEDURE — 97140 MANUAL THERAPY 1/> REGIONS: CPT | Mod: GO

## 2025-01-13 NOTE — PROGRESS NOTES
Occupational Therapy    Occupational Therapy Treatment    Name: Miroslava Roman  MRN: [id number]  : 1965  Date: 25    Time Entry:  Time Calculation  Start Time: 1503  Stop Time: 1548  Time Calculation (min): 45 min        OT Therapeutic Procedures Time Entry  Manual Therapy Time Entry: 30  Therapeutic Exercise Time Entry: 15                Visit #1 in   Assessment:  Patient demonstrated decreased fullness and tightness face, neck, abdomen.  Pt still full, though reports improvements.    Plan:  Continue occupational therapy for modified trunk, abdominal, head and neck, UE lymphedema complete decongestive therapy (CDT), exercises, stretches for decongestion and mobility, pain management, increase optimal function; 1x for every other week. Increase frequency as needed.     Subjective   Pt states she was sick with whooping cough she contracted from daughters who were home from college, is clear but still coughing and fatigued.  Pt feels her whole body is stiff, her neck is full (R>L).  Ribs tight, full.    Pain Assessment:  Discomfort/tight abdomen, neck, face.    Objective    Therapy Diagnosis  1. Lymphedema syndrome, postmastectomy        2. Lymphedema of face        3. Lymphedema        4. Neck stiffness              Objective       Head and neck edema located in the following area(s): .   Face: face (bilaterally) midface nose upper lip   Cheek: full bilaterally  Jowl/Jaw line: full bilaterally   Lower Eyelid: puffy bilaterally     Neck, chest full.   Additional Information: Full abdomen, firm throughout  Face/chin/jaw full.  Voice with congestion.    Treatment  45 minutes    Manual Therapy 30    OT provide manual lymph drainage (MLD) to abdomen, deep and superficial sequence.  OT provide MLD core, chest, neck, face.  Pair MLD with soft tissue techniques/MFR.  Pt showed improvement with swelling, though mild fullness still present.  Voice clarity improved.    Ther Ex 15  Intermittent exercise with MLD  (manual lymph drainage).  Face, neck, shoulder AROM  Supine, knees to chest with hip rotation x5  Bridge x5  Instruct: pt seated on mat- arms over head (breath in), flex forward, cross arms to touch feet (exhale)  x4    Rest breaks with exercises.  Pt easily fatigued today.

## 2025-01-16 ENCOUNTER — TELEPHONE (OUTPATIENT)
Dept: PRIMARY CARE | Facility: CLINIC | Age: 60
End: 2025-01-16
Payer: COMMERCIAL

## 2025-01-16 DIAGNOSIS — Z20.828 EXPOSURE TO INFLUENZA: Primary | ICD-10-CM

## 2025-01-16 RX ORDER — OSELTAMIVIR PHOSPHATE 75 MG/1
75 CAPSULE ORAL DAILY
Qty: 7 CAPSULE | Refills: 0 | Status: SHIPPED | OUTPATIENT
Start: 2025-01-16 | End: 2025-01-23

## 2025-01-16 NOTE — TELEPHONE ENCOUNTER
Pt called stating her daughter tested positive for Flu B. Her daughters doctor suggested that Miroslava Richard be put on tamiflu due to her asthma to prevent her from getting it.    Pharmacy=  [facility] on [address] in [place] closer to [address].    Please call pt at  [phone]

## 2025-01-27 ENCOUNTER — APPOINTMENT (OUTPATIENT)
Dept: OCCUPATIONAL THERAPY | Facility: CLINIC | Age: 60
End: 2025-01-27
Payer: COMMERCIAL

## 2025-01-27 DIAGNOSIS — I97.2 LYMPHEDEMA SYNDROME, POSTMASTECTOMY: Primary | ICD-10-CM

## 2025-01-27 DIAGNOSIS — I89.0 LYMPHEDEMA: ICD-10-CM

## 2025-01-27 DIAGNOSIS — M43.6 NECK STIFFNESS: ICD-10-CM

## 2025-01-27 DIAGNOSIS — I89.0 LYMPHEDEMA OF FACE: ICD-10-CM

## 2025-01-27 PROCEDURE — 97140 MANUAL THERAPY 1/> REGIONS: CPT | Mod: GO

## 2025-01-27 PROCEDURE — 97110 THERAPEUTIC EXERCISES: CPT | Mod: GO

## 2025-01-27 NOTE — PROGRESS NOTES
Occupational Therapy    Occupational Therapy Treatment    Name: Miroslava Roman  MRN: [id number]  : 1965  Date: 25    Time Entry:  Time Calculation  Start Time: 1400  Stop Time: 1455  Time Calculation (min): 55 min        OT Therapeutic Procedures Time Entry  Manual Therapy Time Entry: 40  Therapeutic Exercise Time Entry: 15                Visit # in   Assessment:  Patient demonstrated decreased fullness and tightness face, neck, abdomen.  Pt voice more clear, less congested.  Pt reports feeling less fullness throughout, will continue with HEP.    Plan:  Continue occupational therapy for modified trunk, abdominal, head and neck, UE lymphedema complete decongestive therapy (CDT), exercises, stretches for decongestion and mobility, pain management, increase optimal function; 1x for every other week. Increase frequency as needed.     Subjective   Pt feels her whole body is stiff, her neck is full.  Ribs tight, full (lateral).    Pain Assessment:  Discomfort/tight abdomen, neck, face.    Objective    Therapy Diagnosis  1. Lymphedema syndrome, postmastectomy        2. Lymphedema of face        3. Lymphedema        4. Neck stiffness              Objective       Head and neck edema located in the following area(s): .   Face: face (bilaterally) midface nose upper lip   Cheek: full bilaterally  Jowl/Jaw line: full bilaterally   Lower Eyelid: puffy bilaterally     Neck, chest full.   Additional Information: Full abdomen, firm throughout  Face/chin/jaw full.  Voice with congestion.    Treatment   55 minutes    Manual Therapy 40  OT initiate with abdominal breathing due to breath tight, shallow.  OT provide manual lymph drainage (MLD), deep and superficial sequence., provide trunk, axilla, chest, neck, head/face. OT provide soft tissue techniques, MFR with MLD, especially face, neck, shoulders.  Pt perform ROM, exercises then resume MLD.  Good tissue texture softening, decreased fullness post tx.  Improved voice  clarity with tx.    Ther Ex 15  Intermittent exercise with MLD (manual lymph drainage).  Face, neck, shoulder AROM with deep breathing.  Supine, knees to chest with hip rotation x5  Bridge x5  Overhead arm stretches, shoulder rolls x10.  Rest breaks with exercises.

## 2025-02-06 ENCOUNTER — APPOINTMENT (OUTPATIENT)
Dept: PRIMARY CARE | Facility: CLINIC | Age: 60
End: 2025-02-06
Payer: COMMERCIAL

## 2025-02-06 VITALS
WEIGHT: 172.4 LBS | HEIGHT: 65 IN | OXYGEN SATURATION: 96 % | DIASTOLIC BLOOD PRESSURE: 86 MMHG | SYSTOLIC BLOOD PRESSURE: 116 MMHG | BODY MASS INDEX: 28.72 KG/M2 | HEART RATE: 91 BPM

## 2025-02-06 DIAGNOSIS — J45.909 MODERATE ASTHMA, UNSPECIFIED WHETHER COMPLICATED, UNSPECIFIED WHETHER PERSISTENT (HHS-HCC): ICD-10-CM

## 2025-02-06 DIAGNOSIS — J45.20 MILD INTERMITTENT ASTHMA WITHOUT COMPLICATION (HHS-HCC): ICD-10-CM

## 2025-02-06 DIAGNOSIS — R41.89 BRAIN FOG: ICD-10-CM

## 2025-02-06 DIAGNOSIS — Z00.00 ROUTINE HEALTH MAINTENANCE: Primary | ICD-10-CM

## 2025-02-06 PROCEDURE — 3008F BODY MASS INDEX DOCD: CPT | Performed by: INTERNAL MEDICINE

## 2025-02-06 PROCEDURE — 99396 PREV VISIT EST AGE 40-64: CPT | Performed by: INTERNAL MEDICINE

## 2025-02-06 PROCEDURE — 1036F TOBACCO NON-USER: CPT | Performed by: INTERNAL MEDICINE

## 2025-02-06 RX ORDER — FLUTICASONE PROPIONATE 110 UG/1
1 AEROSOL, METERED RESPIRATORY (INHALATION)
Qty: 12 G | Refills: 11 | Status: SHIPPED | OUTPATIENT
Start: 2025-02-06 | End: 2026-02-06

## 2025-02-06 RX ORDER — ALBUTEROL SULFATE 90 UG/1
1-2 INHALANT RESPIRATORY (INHALATION) AS NEEDED
Qty: 54 G | Refills: 1 | Status: SHIPPED | OUTPATIENT
Start: 2025-02-06

## 2025-02-06 ASSESSMENT — ENCOUNTER SYMPTOMS
HEMATURIA: 0
WHEEZING: 0
MYALGIAS: 0
CHEST TIGHTNESS: 0
DYSPHORIC MOOD: 0
VOMITING: 0
WOUND: 0
DIARRHEA: 0
HEADACHES: 1
CHILLS: 0
PALPITATIONS: 0
NAUSEA: 0
RHINORRHEA: 0
FEVER: 0
SORE THROAT: 0
CONSTIPATION: 0
POLYDIPSIA: 0
ABDOMINAL PAIN: 0
DYSURIA: 0
EYE PAIN: 0
NERVOUS/ANXIOUS: 0
ARTHRALGIAS: 0
FREQUENCY: 0
UNEXPECTED WEIGHT CHANGE: 0
BLOOD IN STOOL: 0
SHORTNESS OF BREATH: 0
POLYPHAGIA: 0
DIZZINESS: 0
COUGH: 0

## 2025-02-06 NOTE — PROGRESS NOTES
"Subjective   Patient ID: Miroslava Roman is a 59 y.o. female who presents for Annual Exam.    HPI   Ct neck is scheduled Tuesday     Dental visits- UTD, seeing today  Eye visits- to schedule     Exercise-does lymphatic massage  Diet-healthy but likes snacks (chips)     Alcohol use-1-2x weekly  Smoking-none    States declines any cancer screening at this time as would not doing anything    Had likely pertussis. States all gone. Just has some brain fog and headaches after    Bad lymphedema day    Review of Systems   Constitutional:  Negative for chills, fever and unexpected weight change.   HENT:  Negative for congestion, hearing loss, rhinorrhea and sore throat.    Eyes:  Negative for pain and visual disturbance.   Respiratory:  Negative for cough, chest tightness, shortness of breath and wheezing.    Cardiovascular:  Negative for chest pain and palpitations.   Gastrointestinal:  Negative for abdominal pain, blood in stool, constipation, diarrhea, nausea and vomiting.   Endocrine: Negative for cold intolerance, heat intolerance, polydipsia and polyphagia.   Genitourinary:  Negative for dysuria, frequency and hematuria.   Musculoskeletal:  Negative for arthralgias and myalgias.   Skin:  Negative for rash and wound.   Neurological:  Positive for headaches. Negative for dizziness and syncope.   Psychiatric/Behavioral:  Negative for dysphoric mood. The patient is not nervous/anxious.        Objective   /86 (BP Location: Left arm, Patient Position: Sitting, BP Cuff Size: Adult)   Pulse 91   Ht 1.651 m (5' 5\")   Wt 78.2 kg (172 lb 6.4 oz)   SpO2 96%   BMI 28.69 kg/m²     Physical Exam  Vitals reviewed.   Constitutional:       Appearance: Normal appearance. She is not ill-appearing.   HENT:      Head: Normocephalic and atraumatic.      Right Ear: Tympanic membrane normal.      Left Ear: Tympanic membrane normal.      Nose: Nose normal.      Mouth/Throat:      Mouth: Mucous membranes are moist.      Pharynx: " Oropharynx is clear.   Eyes:      Extraocular Movements: Extraocular movements intact.      Conjunctiva/sclera: Conjunctivae normal.      Pupils: Pupils are equal, round, and reactive to light.   Neck:      Comments: Lymphedema present in neck. No goiter noted.  Cardiovascular:      Rate and Rhythm: Normal rate and regular rhythm.      Heart sounds: Normal heart sounds. No murmur heard.     No gallop.   Pulmonary:      Effort: Pulmonary effort is normal. No respiratory distress.      Breath sounds: Normal breath sounds. No wheezing.   Abdominal:      General: There is no distension.      Palpations: Abdomen is soft. There is no mass.      Tenderness: There is no abdominal tenderness.   Musculoskeletal:         General: No swelling.      Cervical back: Neck supple.      Right lower leg: No edema.      Left lower leg: No edema.   Lymphadenopathy:      Cervical: No cervical adenopathy.   Neurological:      General: No focal deficit present.      Mental Status: She is alert and oriented to person, place, and time.      Gait: Gait normal.   Psychiatric:         Mood and Affect: Mood normal.         Behavior: Behavior normal.         Thought Content: Thought content normal.         Assessment/Plan   Problem List Items Addressed This Visit             ICD-10-CM    Asthma J45.909    Relevant Medications    albuterol 90 mcg/actuation inhaler    fluticasone (Flovent HFA) 110 mcg/actuation inhaler     Other Visit Diagnoses         Codes    Routine health maintenance    -  Primary Z00.00    Relevant Orders    CBC    Comprehensive Metabolic Panel    Lipid Panel    Hemoglobin A1C    Referral to Ophthalmology    Brain fog     R41.89    Relevant Orders    TSH with reflex to Free T4 if abnormal    Vitamin B12    Vitamin D 25-Hydroxy,Total (for eval of Vitamin D levels)              CPE completed.  Advised to keep a heart healthy, low fat diet with fruits and veggies like Mediterranean diet.  Advised on the importance of exercise and  maintaining 150 minutes of exercise per week (30 minutes per day 5 days a week).  Advised on regular eye and dental visits.  Discussed age appropriate cancer screening, immunizations and recommendations given.  Discussed avoiding illicit drugs and tobacco. Advised on appropriate use of alcohol.  Advised to wear seat belt.    Neck swelling- to have CT    ? Pertussis-resolved  -residual brain fog- check     Elevated LFTS: repeat 3 months--never did, ordered     Mild intermittent asthma:  -has had many flares and has been in low 80s pulse ox in past.   -seeing pulmonary  -uses flovent during illnesses and helps  -unable to do singulair anymore as makes her dizzy  -on proair as needed  -generally requires 12 day steroid course with flares  -doing flonase     Chronic Lymphedema:  -goes go lymphedema clinic for head and neck twice a month  -to schedule CT neck     Right breast cancer in situ 3/19 s/p right mastectomy and LN dissection: no chemo or radiation, follows with Dr. Julian  -declines followup and mammograms at this time     CPE 1 year. Labs  Health Maintenance  -Pap smear: she declines  -Mammogram: 7/20--follows with Dr. Julian, declines  -Colonoscopy: declines c-scope and cologuard  -Lung Cancer Screening: never smoker  -Vaccinations: denies influenza, shingrix and pneumovax (she will think about)  -DEXA: at 66

## 2025-02-10 ENCOUNTER — APPOINTMENT (OUTPATIENT)
Dept: OCCUPATIONAL THERAPY | Facility: CLINIC | Age: 60
End: 2025-02-10
Payer: COMMERCIAL

## 2025-02-10 DIAGNOSIS — M43.6 NECK STIFFNESS: ICD-10-CM

## 2025-02-10 DIAGNOSIS — I89.0 LYMPHEDEMA OF FACE: ICD-10-CM

## 2025-02-10 DIAGNOSIS — S52.572D OTHER CLOSED INTRA-ARTICULAR FRACTURE OF DISTAL END OF LEFT RADIUS WITH ROUTINE HEALING, SUBSEQUENT ENCOUNTER: ICD-10-CM

## 2025-02-10 DIAGNOSIS — I89.0 LYMPHEDEMA: ICD-10-CM

## 2025-02-10 DIAGNOSIS — I97.2 LYMPHEDEMA SYNDROME, POSTMASTECTOMY: Primary | ICD-10-CM

## 2025-02-10 PROCEDURE — 97140 MANUAL THERAPY 1/> REGIONS: CPT | Mod: GO | Performed by: OCCUPATIONAL THERAPIST

## 2025-02-10 NOTE — PROGRESS NOTES
Occupational Therapy    Occupational Therapy Treatment    Name: Miroslava Roman  MRN: [id number]  : 1965  Date: 02/10/25    Time Entry:    Assessment:  Pt demonstrated decreased fullness and in head/neck post treatment (R side > L side).  Pt educated pt complete gentle ROM stretches for neck to further reduce tightness.  Pt expressed carryover     Plan:  Continue occupational therapy for modified trunk, abdominal, head and neck, UE lymphedema complete decongestive therapy (CDT), exercises, stretches for decongestion and mobility, pain management, increase optimal function; 1x for every other week. Increase frequency as needed.     Subjective   Pt reports she feels her neck has been chicas the last 4 or so days, especially since she had dental work done.     Pain Assessment:  Discomfort/tight abdomen, neck, face.    Objective    Therapy Diagnosis  1. Lymphedema syndrome, postmastectomy        2. Lymphedema of face        3. Lymphedema        4. Neck stiffness        5. Other closed intra-articular fracture of distal end of left radius with routine healing, subsequent encounter              Objective       Head and neck edema located in the following area(s): .   Face: face (bilaterally) midface nose upper lip   Cheek: full bilaterally  Jowl/Jaw line: full bilaterally   Lower Eyelid: puffy bilaterally     Neck, chest full.   Additional Information: Full abdomen, firm throughout  Face/chin/jaw full.  Voice with congestion.    Treatment   56 minutes    Manual Therapy 56  OT initiated MLD sequence with deep diaphragmatic breathing and opening of lymph nodes to promote lymphatic circulation.   OT provided MLD to B trunk, abdomen, chest, neck (anterior and posterior), and face. Softened tissue texture and decreased fullness noted post tx.

## 2025-02-18 ENCOUNTER — APPOINTMENT (OUTPATIENT)
Dept: OCCUPATIONAL THERAPY | Facility: CLINIC | Age: 60
End: 2025-02-18
Payer: COMMERCIAL

## 2025-02-18 DIAGNOSIS — M43.6 NECK STIFFNESS: ICD-10-CM

## 2025-02-18 DIAGNOSIS — I89.0 LYMPHEDEMA OF FACE: ICD-10-CM

## 2025-02-18 DIAGNOSIS — I97.2 LYMPHEDEMA SYNDROME, POSTMASTECTOMY: Primary | ICD-10-CM

## 2025-02-18 DIAGNOSIS — I89.0 LYMPHEDEMA: ICD-10-CM

## 2025-02-18 PROCEDURE — 97110 THERAPEUTIC EXERCISES: CPT | Mod: GO

## 2025-02-18 PROCEDURE — 97140 MANUAL THERAPY 1/> REGIONS: CPT | Mod: GO

## 2025-02-18 NOTE — PROGRESS NOTES
Occupational Therapy    Occupational Therapy Treatment    Name: Miroslava Roman  MRN: [id number]  : 1965  Date: 25    Time Entry:  Time Calculation  Start Time: 830  Stop Time: 925  Time Calculation (min): 55 min        OT Therapeutic Procedures Time Entry  Manual Therapy Time Entry: 45  Therapeutic Exercise Time Entry: 10              Visit #5 in   Assessment:  Pt demonstrated decreased fullness and tightness and in head/neck post treatment (R side > L side).    Decreased abdominal tightness and less congested voice quality post tx.  Home program strategiesa for management, especially on vacation (to [facility]).    Plan:  Continue occupational therapy for modified trunk, abdominal, head and neck, UE lymphedema complete decongestive therapy (CDT), exercises, stretches for decongestion and mobility, pain management, increase optimal function; 1x for every other week. Increase frequency as needed.     Subjective   Pt reports dental issues, is still swollen. Pt getting crowns (2) today.  L side of face more swollen from dental work.  Supraclavicular swelling noted.    Pain Assessment:  Discomfort/tight abdomen, neck, face.    Objective    Therapy Diagnosis  1. Lymphedema syndrome, postmastectomy        2. Lymphedema of face        3. Lymphedema        4. Neck stiffness              Objective       Head and neck edema located in the following area(s): .   Face: face (bilaterally) midface nose upper lip   Cheek: full bilaterally  Jowl/Jaw line: full bilaterally   Lower Eyelid: puffy bilaterally     Neck, chest full.   Additional Information: Full abdomen, firm throughout  Face/chin/jaw full.  Voice with congestion.      Full shoulder, clavicular area.    Treatment   56 minutes    Manual Therapy 45  OT provided initiated MLD (manual lymph drainage) with abdominal clearing sequence, both deep and superficial.  OT provide sequence with deep diaphragmatic breathing and opening of lymph nodes to promote  lymphatic circulation.   OT provided MLD chest/axilla, neck, face/neck.  OT pair gentle soft tissue techniques with MLD.  Repetition of clearing passages to support optimal clearing.  Softened tissue texture and decreased fullness noted post tx. Still full clavicles though improved.  Voice congestion improve.    Ther Ex: 10  Intermittent ROM exercise with MLD (manual lymph drainage).  Face, neck, shoulder AROM with deep breathing.  Supine, knees to chest with hip rotation x5  Bridge x5

## 2025-02-24 ENCOUNTER — APPOINTMENT (OUTPATIENT)
Dept: OCCUPATIONAL THERAPY | Facility: CLINIC | Age: 60
End: 2025-02-24
Payer: COMMERCIAL

## 2025-02-24 DIAGNOSIS — I89.0 LYMPHEDEMA: ICD-10-CM

## 2025-02-24 DIAGNOSIS — I89.0 LYMPHEDEMA OF FACE: ICD-10-CM

## 2025-02-24 DIAGNOSIS — I97.2 LYMPHEDEMA SYNDROME, POSTMASTECTOMY: Primary | ICD-10-CM

## 2025-03-04 ENCOUNTER — APPOINTMENT (OUTPATIENT)
Dept: DERMATOLOGY | Facility: CLINIC | Age: 60
End: 2025-03-04
Payer: COMMERCIAL

## 2025-03-04 DIAGNOSIS — D22.5 MELANOCYTIC NEVI OF TRUNK: ICD-10-CM

## 2025-03-04 DIAGNOSIS — D18.01 HEMANGIOMA OF SKIN: ICD-10-CM

## 2025-03-04 DIAGNOSIS — Z12.83 ENCOUNTER FOR SCREENING FOR MALIGNANT NEOPLASM OF SKIN: ICD-10-CM

## 2025-03-04 DIAGNOSIS — L81.4 LENTIGO: ICD-10-CM

## 2025-03-04 DIAGNOSIS — L57.8 PHOTOAGING OF SKIN: Primary | ICD-10-CM

## 2025-03-04 DIAGNOSIS — D22.60 MELANOCYTIC NEVI OF UNSPECIFIED UPPER LIMB, INCLUDING SHOULDER: ICD-10-CM

## 2025-03-04 DIAGNOSIS — D22.71 MELANOCYTIC NEVI OF RIGHT LOWER LIMB, INCLUDING HIP: ICD-10-CM

## 2025-03-04 DIAGNOSIS — L82.1 SEBORRHEIC KERATOSIS: ICD-10-CM

## 2025-03-04 DIAGNOSIS — L72.0 MILIA: ICD-10-CM

## 2025-03-04 DIAGNOSIS — D22.72 MELANOCYTIC NEVI OF LEFT LOWER EXTREMITY OR HIP: ICD-10-CM

## 2025-03-04 PROCEDURE — 99213 OFFICE O/P EST LOW 20 MIN: CPT | Performed by: DERMATOLOGY

## 2025-03-04 PROCEDURE — 1036F TOBACCO NON-USER: CPT | Performed by: DERMATOLOGY

## 2025-03-04 NOTE — PROGRESS NOTES
Subjective     Miroslava Roman is a 59 y.o. female who presents for the following: Skin Check (Pt here for yearly FBSE. No hx of skin cancer. No concerns today.).     Review of Systems:  No other skin or systemic complaints other than what is documented elsewhere in the note.    The following portions of the chart were reviewed this encounter and updated as appropriate:         Skin Cancer History  No skin cancer on file.      Specialty Problems          Dermatology Problems    Dermatophytosis of foot    Contusion of knee    Melanocytic nevi of other parts of face    Melanocytic nevi of trunk    Melanocytic nevi of unspecified lower limb, including hip    Melanocytic nevi of unspecified upper limb, including shoulder    Other melanin hyperpigmentation    Other seborrheic keratosis    Sebaceous cyst        Objective   Well appearing patient in no apparent distress; mood and affect are within normal limits.    A full examination was performed including scalp, head, eyes, ears, nose, lips, neck, chest, axillae, abdomen, back, buttocks, bilateral upper extremities, bilateral lower extremities, hands, feet, fingers, toes, fingernails, and toenails. Declined examination of genitals. All findings within normal limits unless otherwise noted below.    Assessment/Plan   1. Photoaging of skin  Mottled pigmentation with telangiectasias and brown reticular macules in sun exposed areas of the body.    The risk of chronic, cumulative sun damage and risk of development of skin cancer was reviewed today.   The importance of sun protection was reviewed: including the use of a broad spectrum sunscreen that protects against both UVA/UVB rays, with ingredients such as Zinc oxide or titanium dioxide, wearing sun protective clothing and sun avoidance. We reviewed the warning signs of non-melanoma skin cancer and ABCDEs of melanoma  Please follow up should you notice any new or changing pre-existing skin lesion.    Related Procedures  Follow  Up In Dermatology - Established Patient    2. Seborrheic keratosis (2)  Generalized, Left Lower Back  Brown, tan waxy macules and stuck on appearing papules and plaques    The benign nature of these skin lesions reviewed, reassure provided and no further treatment needed at this time.   These lesions can be removed, if symptomatic (itching, bleeding, rubbing on clothing, painful), otherwise removal is considered cosmetic.     3. Lentigo  Scattered tan macules in sun-exposed areas.    These are benign skin lesions due to sun exposure. They will darken in response to sun exposure. They should be monitored for change in size, shape or color.  These lesions can be treated cosmetically with topical creams, liquid nitrogen and a variety of lasers.    4. Hemangioma of skin  Cherry red papules    The benign nature of these skin lesions were reviewed, no treatment is necessary.   Please follow up for any new or pre-existing lesion that is changing in size, shape, color, becomes painful, tender, itches or bleed.    5. Melanocytic nevi of left lower extremity or hip  Left Leg  Scattered, uniform and benign-appearing, regular brown melanocytic papules and macules.    Clinically benign appearing nevi, no treatment is necessary.  The importance of sun protection was reviewed: including the use of a broad spectrum sunscreen that protects against both UVA/UVB rays, with ingredients such as Zinc oxide or titanium dioxide, wearing sun protective clothing and sun avoidance.   ABCDEs of melanoma reviewed.  Please follow up should you notice any new or changing pre-existing skin lesion.    6. Melanocytic nevi of right lower limb, including hip  Right Leg  Scattered, uniform and benign-appearing, regular brown melanocytic papules and macules.    Clinically benign appearing nevi, no treatment is necessary.  The importance of sun protection was reviewed: including the use of a broad spectrum sunscreen that protects against both UVA/UVB  rays, with ingredients such as Zinc oxide or titanium dioxide, wearing sun protective clothing and sun avoidance.   ABCDEs of melanoma reviewed.  Please follow up should you notice any new or changing pre-existing skin lesion.    7. Melanocytic nevi of unspecified upper limb, including shoulder (2)  Left Arm, Right Arm  Scattered, uniform and benign-appearing, regular brown melanocytic papules and macules.    Clinically benign appearing nevi, no treatment is necessary.  The importance of sun protection was reviewed: including the use of a broad spectrum sunscreen that protects against both UVA/UVB rays, with ingredients such as Zinc oxide or titanium dioxide, wearing sun protective clothing and sun avoidance.   ABCDEs of melanoma reviewed.  Please follow up should you notice any new or changing pre-existing skin lesion.    8. Melanocytic nevi of trunk  Torso - Posterior (Back)  Tan-brown symmetric macules and papules    Clinically benign appearing nevi, no treatment is necessary.  The importance of sun protection was reviewed: including the use of a broad spectrum sunscreen that protects against both UVA/UVB rays, with ingredients such as Zinc oxide or titanium dioxide, wearing sun protective clothing and sun avoidance.   ABCDEs of melanoma reviewed.  Please follow up should you notice any new or changing pre-existing skin lesion.    9. Milia  Head - Anterior (Face)  Small 1-2 mm white papules.    The benign nature of these skin lesions were reviewed, no treatment is necessary.   Please follow up for any new or pre-existing lesion that is changing in size, shape, color, becomes painful, tender, itches or bleed.      10. Encounter for screening for malignant neoplasm of skin  No suspicious lesions noted on examination today    The risk of chronic, cumulative sun damage and risk of development of skin cancer was reviewed today.   The importance of sun protection was reviewed: including the use of a broad spectrum  sunscreen of at least SPF 30 that protects against both UVA/UVB rays, with ingredients such as Zinc oxide or titanium dioxide, wearing sun protective clothing and sun avoidance. We reviewed the warning signs of non-melanoma skin cancer and ABCDEs of melanoma  Please follow up should you notice any new or changing pre-existing skin lesion.    Related Procedures  Follow Up In Dermatology - Established Patient  Follow Up In Dermatology - Established Patient        Follow up in 1 year for FBSE

## 2025-03-06 ENCOUNTER — APPOINTMENT (OUTPATIENT)
Dept: OCCUPATIONAL THERAPY | Facility: CLINIC | Age: 60
End: 2025-03-06
Payer: COMMERCIAL

## 2025-03-06 DIAGNOSIS — I97.2 LYMPHEDEMA SYNDROME, POSTMASTECTOMY: Primary | ICD-10-CM

## 2025-03-06 DIAGNOSIS — I89.0 LYMPHEDEMA OF FACE: ICD-10-CM

## 2025-03-06 DIAGNOSIS — I89.0 LYMPHEDEMA: ICD-10-CM

## 2025-03-06 PROCEDURE — 97140 MANUAL THERAPY 1/> REGIONS: CPT | Mod: GO | Performed by: OCCUPATIONAL THERAPIST

## 2025-03-06 NOTE — PROGRESS NOTES
Occupational Therapy    Occupational Therapy Treatment    Name: Miroslava Roman  MRN: [id number]  : 1965  Date: 25    Time Entry:    Visit #6 in     Assessment:  Pt demonstrated decreased full and firmness in abdomen, trunk and BLES post treatment.  Pt expressed fullness lighter after completion of MLD.    Plan:  Continue occupational therapy for modified trunk, abdominal, head and neck, UE lymphedema complete decongestive therapy (CDT), exercises, stretches for decongestion and mobility, pain management, increase optimal function; 1x for every other week. Increase frequency as needed.     Subjective   Pt reports she just got back from a trip to the [facility].  Pt reports she did pretty well on the trip, felt a little worn from walking on the sand.     Pain Assessment:  Discomfort/tight abdomen, neck, face.    Objective    Therapy Diagnosis  1. Lymphedema syndrome, postmastectomy        2. Lymphedema of face        3. Lymphedema            Objective       Head and neck edema located in the following area(s): .   Face: face (bilaterally) midface nose upper lip   Cheek: full bilaterally  Jowl/Jaw line: full bilaterally   Lower Eyelid: puffy bilaterally     Neck, chest full.   Additional Information: Full abdomen, firm throughout  Face/chin/jaw full.  Voice with congestion.      Full shoulder, clavicular area.    Treatment   54 minutes    Manual Therapy 54  T provided manual lymph drainage to abdomen both superficial and deep abdominal paired with diaphragmatic breathing, B trunk and BLES.

## 2025-03-13 ENCOUNTER — TELEPHONE (OUTPATIENT)
Dept: PRIMARY CARE | Facility: CLINIC | Age: 60
End: 2025-03-13
Payer: COMMERCIAL

## 2025-03-13 NOTE — TELEPHONE ENCOUNTER
Called patient LVM informing her CT of her neck is normal and to give office a call back if any further questions or concerns.

## 2025-03-18 ENCOUNTER — APPOINTMENT (OUTPATIENT)
Dept: OCCUPATIONAL THERAPY | Facility: CLINIC | Age: 60
End: 2025-03-18
Payer: COMMERCIAL

## 2025-03-18 DIAGNOSIS — I89.0 LYMPHEDEMA OF FACE: ICD-10-CM

## 2025-03-18 DIAGNOSIS — I89.0 LYMPHEDEMA: ICD-10-CM

## 2025-03-18 DIAGNOSIS — I97.2 LYMPHEDEMA SYNDROME, POSTMASTECTOMY: Primary | ICD-10-CM

## 2025-03-18 PROCEDURE — 97140 MANUAL THERAPY 1/> REGIONS: CPT | Mod: GO | Performed by: OCCUPATIONAL THERAPIST

## 2025-03-18 NOTE — PROGRESS NOTES
Occupational Therapy    Occupational Therapy Treatment    Name: Miroslava Roman  MRN: [id number]  : 1965  Date: 25    Time Entry:    Visit #7 in     Assessment:  Pt demonstrates decreased fullness in full body post treatment.  In addition, pt demo'd increased energy and activity tolerance needed for ADLs, IADLs and leisure tasks with decreased swelling.     Plan:  Continue occupational therapy for modified trunk, abdominal, head and neck, UE lymphedema complete decongestive therapy (CDT), exercises, stretches for decongestion and mobility, pain management, increase optimal function; 1x for every other week. Increase frequency as needed.     Subjective   Pt reports her jaw and full body have felt less full since completion of MLD in last treatment.     Pain Assessment:  Discomfort/tight abdomen, neck, face.    Objective    Therapy Diagnosis  1. Lymphedema syndrome, postmastectomy        2. Lymphedema of face        3. Lymphedema            Objective       Head and neck edema located in the following area(s): .   Face: face (bilaterally) midface nose upper lip   Cheek: full bilaterally  Jowl/Jaw line: full bilaterally   Lower Eyelid: puffy bilaterally     Neck, chest full.   Additional Information: Full abdomen, firm throughout  Face/chin/jaw full.  Voice with congestion.      Full shoulder, clavicular area.    Treatment   56 minutes    Manual Therapy 56  OT provided manual lymph drainage to abdomen both superficial and deep abdominal paired with diaphragmatic breathing, B trunk, BLE, head and neck (anterior and posterior).

## 2025-03-26 ENCOUNTER — TREATMENT (OUTPATIENT)
Dept: OCCUPATIONAL THERAPY | Facility: CLINIC | Age: 60
End: 2025-03-26
Payer: COMMERCIAL

## 2025-03-26 DIAGNOSIS — I89.0 LYMPHEDEMA OF FACE: ICD-10-CM

## 2025-03-26 DIAGNOSIS — I97.2 LYMPHEDEMA SYNDROME, POSTMASTECTOMY: Primary | ICD-10-CM

## 2025-03-26 DIAGNOSIS — I89.0 LYMPHEDEMA: ICD-10-CM

## 2025-03-26 PROCEDURE — 97140 MANUAL THERAPY 1/> REGIONS: CPT | Mod: GO | Performed by: OCCUPATIONAL THERAPIST

## 2025-03-27 NOTE — PROGRESS NOTES
Occupational Therapy    Occupational Therapy Treatment    Name: Miroslava Roman  MRN: [id number]  : 1965  Date: 25    Time Entry:    Visit #8 in     Assessment:  Pt demonstrated increased fullness in abdomen and head/neck on today's date after having been recently sick with the flu.  Pt demonstrated decreased fullness and improved mobility post manual lymphatic drainage.     Plan:  Continue occupational therapy for modified trunk, abdominal, head and neck, UE lymphedema complete decongestive therapy (CDT), exercises, stretches for decongestion and mobility, pain management, increase optimal function; 1x for every other week. Increase frequency as needed.     Subjective   Pt reports she is more full after recovering from the flu that she had from -Tuesday.    Pain Assessment:  Discomfort/tight abdomen, neck, face.    Objective    Therapy Diagnosis  1. Lymphedema syndrome, postmastectomy        2. Lymphedema of face        3. Lymphedema            Objective       Head and neck edema located in the following area(s): .   Face: face (bilaterally) midface nose upper lip   Cheek: full bilaterally  Jowl/Jaw line: full bilaterally   Lower Eyelid: puffy bilaterally     Neck, chest full.   Additional Information: Full abdomen, firm throughout  Face/chin/jaw full.  Voice with congestion.      Full shoulder, clavicular area.    Treatment   39 minutes    Manual Therapy 39  OT provided manual lymph drainage to abdomen both superficial and deep abdominal paired with diaphragmatic breathing, B trunk, head and neck (anterior and posterior).   -decreased fullness post treatment

## 2025-03-31 ENCOUNTER — APPOINTMENT (OUTPATIENT)
Dept: OCCUPATIONAL THERAPY | Facility: CLINIC | Age: 60
End: 2025-03-31
Payer: COMMERCIAL

## 2025-03-31 DIAGNOSIS — M43.6 NECK STIFFNESS: ICD-10-CM

## 2025-03-31 DIAGNOSIS — I89.0 LYMPHEDEMA OF FACE: ICD-10-CM

## 2025-03-31 DIAGNOSIS — I89.0 LYMPHEDEMA: ICD-10-CM

## 2025-03-31 DIAGNOSIS — I97.2 LYMPHEDEMA SYNDROME, POSTMASTECTOMY: Primary | ICD-10-CM

## 2025-03-31 PROCEDURE — 97140 MANUAL THERAPY 1/> REGIONS: CPT | Mod: GO | Performed by: OCCUPATIONAL THERAPIST

## 2025-03-31 NOTE — PROGRESS NOTES
Occupational Therapy    Occupational Therapy Treatment    Name: Miroslava Roman  MRN: [id number]  : 1965  Date: 25    Time Entry:    Visit #9 in     Assessment:  Pt demonstrated decreased fullness throughout BLEs, trunk /abdomen and head and neck post treatment     Plan:  Continue occupational therapy for modified trunk, abdominal, head and neck, UE lymphedema complete decongestive therapy (CDT), exercises, stretches for decongestion and mobility, pain management, increase optimal function; 1x for every other week. Increase frequency as needed.     Subjective   Pt reports she felt a lot of relief post treatment last weekend, however noted increased head/neck fullness on  due to allergies after having sat outside all day Saturday     Pain Assessment:  Discomfort/tight abdomen, neck, face.    Objective    Therapy Diagnosis  1. Lymphedema syndrome, postmastectomy        2. Lymphedema of face        3. Lymphedema        4. Neck stiffness            Objective       Head and neck edema located in the following area(s): .   Face: face (bilaterally) midface nose upper lip   Cheek: full bilaterally  Jowl/Jaw line: full bilaterally   Lower Eyelid: puffy bilaterally     Neck, chest full.   Additional Information: Full abdomen, firm throughout  Face/chin/jaw full.  Voice with congestion.      Full shoulder, clavicular area.    Treatment   55 minutes    Manual Therapy 55  OT provided manual lymph drainage to abdomen both superficial and deep abdominal paired with diaphragmatic breathing, B trunk, BLES, head and neck (anterior and posterior).   -decreased fullness post treatment

## 2025-04-04 ENCOUNTER — TELEPHONE (OUTPATIENT)
Dept: PRIMARY CARE | Facility: CLINIC | Age: 60
End: 2025-04-04
Payer: COMMERCIAL

## 2025-04-04 DIAGNOSIS — J45.20 MILD INTERMITTENT ASTHMA WITHOUT COMPLICATION (HHS-HCC): Primary | ICD-10-CM

## 2025-04-04 RX ORDER — METHYLPREDNISOLONE 4 MG/1
TABLET ORAL
Qty: 21 TABLET | Refills: 0 | Status: SHIPPED | OUTPATIENT
Start: 2025-04-04 | End: 2025-04-10

## 2025-04-04 NOTE — TELEPHONE ENCOUNTER
Pt called in stating she was outdoors exposed to a lot of pollen. She woke up sob and had to use her inhaler that barely worked and then she had to start her steroid pack that Dr Khan prescribed her. She called in to inform us of this since she used her steroids she likes to keep for emergencies. Pt was advised if she become worse or sob again to go to ER. If a refill is able to be advised, please advise. Thank you!

## 2025-04-04 NOTE — TELEPHONE ENCOUNTER
Called pt to inform and she is aware to go to ER for worsening SOB, any chest pain or O2 under 90%

## 2025-04-15 ENCOUNTER — APPOINTMENT (OUTPATIENT)
Dept: OCCUPATIONAL THERAPY | Facility: CLINIC | Age: 60
End: 2025-04-15
Payer: COMMERCIAL

## 2025-04-15 DIAGNOSIS — I89.0 LYMPHEDEMA: ICD-10-CM

## 2025-04-15 DIAGNOSIS — I89.0 LYMPHEDEMA OF FACE: ICD-10-CM

## 2025-04-15 DIAGNOSIS — I97.2 LYMPHEDEMA SYNDROME, POSTMASTECTOMY: Primary | ICD-10-CM

## 2025-04-15 PROCEDURE — 97140 MANUAL THERAPY 1/> REGIONS: CPT | Mod: GO | Performed by: OCCUPATIONAL THERAPIST

## 2025-04-15 NOTE — PROGRESS NOTES
Occupational Therapy    Occupational Therapy Treatment    Name: Miroslava Roman  MRN: [id number]  : 1965  Date: 04/15/25    Time Entry:    Visit #10 in     Assessment:  Pt demonstrated decreased fullness and tightness in back post treatment.     Plan:  Continue occupational therapy for modified trunk, abdominal, head and neck, UE lymphedema complete decongestive therapy (CDT), exercises, stretches for decongestion and mobility, pain management, increase optimal function; 1x for every other week. Increase frequency as needed.     Subjective   Pt reports she was recently put on a steroid for allergy/asthma relief.  Pt reports she is now starting to feel better.     Pain Assessment:  Discomfort/tight abdomen, neck, face.    Objective    Therapy Diagnosis  1. Lymphedema syndrome, postmastectomy        2. Lymphedema of face        3. Lymphedema            Objective       Head and neck edema located in the following area(s): .   Face: face (bilaterally) midface nose upper lip   Cheek: full bilaterally  Jowl/Jaw line: full bilaterally   Lower Eyelid: puffy bilaterally     Neck, chest full.   Additional Information: Full abdomen, firm throughout  Face/chin/jaw full.  Voice with congestion.      Full shoulder, clavicular area.    Treatment   54 minutes    Manual Therapy 54  OT provided manual lymph drainage to abdomen both superficial and deep abdominal paired with diaphragmatic breathing, back, B trunk, head and neck (anterior and posterior).   -decreased fullness post treatment

## 2025-04-29 ENCOUNTER — APPOINTMENT (OUTPATIENT)
Dept: OCCUPATIONAL THERAPY | Facility: CLINIC | Age: 60
End: 2025-04-29
Payer: COMMERCIAL

## 2025-04-29 DIAGNOSIS — I89.0 LYMPHEDEMA: ICD-10-CM

## 2025-04-29 DIAGNOSIS — I97.2 LYMPHEDEMA SYNDROME, POSTMASTECTOMY: Primary | ICD-10-CM

## 2025-04-29 DIAGNOSIS — I89.0 LYMPHEDEMA OF FACE: ICD-10-CM

## 2025-04-29 PROCEDURE — 97140 MANUAL THERAPY 1/> REGIONS: CPT | Mod: GO | Performed by: OCCUPATIONAL THERAPIST

## 2025-04-29 NOTE — PROGRESS NOTES
Occupational Therapy    Occupational Therapy Treatment    Name: Miroslava Roman  MRN: [id number]  : 1965  Date: 25    Time Entry:    Visit #11 in     Assessment:  Pt demonstrated good tissue softened in head/neck post manual lymph drainage treatment    Plan:  Continue occupational therapy for modified trunk, abdominal, head and neck, UE lymphedema complete decongestive therapy (CDT), exercises, stretches for decongestion and mobility, pain management, increase optimal function; 1x for every other week. Increase frequency as needed.     Subjective   Pt reports she worked hard over the weekend to clear out the fluid within her body, however she still feels full in head/neck, LLE and abdomen     Pain Assessment:  Discomfort/tight abdomen, neck, face.    Objective    Therapy Diagnosis  1. Lymphedema syndrome, postmastectomy        2. Lymphedema of face        3. Lymphedema            Objective       Head and neck edema located in the following area(s): .   Face: face (bilaterally) midface nose upper lip   Cheek: full bilaterally  Jowl/Jaw line: full bilaterally   Lower Eyelid: puffy bilaterally     Neck, chest full.   Additional Information: Full abdomen, firm throughout  Face/chin/jaw full.  Voice with congestion.      Full shoulder, clavicular area.    Treatment   54 minutes    Manual Therapy 54    OT provided manual lymph drainage to abdomen both superficial and deep abdominal paired with diaphragmatic breathing, LLE, chest B trunk, head and neck (anterior and posterior).   -decreased fullness post treatment .

## 2025-05-13 ENCOUNTER — APPOINTMENT (OUTPATIENT)
Dept: OCCUPATIONAL THERAPY | Facility: CLINIC | Age: 60
End: 2025-05-13
Payer: COMMERCIAL

## 2025-05-13 DIAGNOSIS — I97.2 LYMPHEDEMA SYNDROME, POSTMASTECTOMY: Primary | ICD-10-CM

## 2025-05-13 DIAGNOSIS — I89.0 LYMPHEDEMA: ICD-10-CM

## 2025-05-13 DIAGNOSIS — I89.0 LYMPHEDEMA OF FACE: ICD-10-CM

## 2025-05-13 PROCEDURE — 97140 MANUAL THERAPY 1/> REGIONS: CPT | Mod: GO | Performed by: OCCUPATIONAL THERAPIST

## 2025-05-13 NOTE — PROGRESS NOTES
Occupational Therapy    Occupational Therapy Treatment    Name: Miroslava Roman  MRN: [id number]  : 1965  Date: 25    Time Entry:    Visit #12 in     Assessment:  Patient with decreased lymphatic fullness throughout BLES, abdomen/trunk and head/neck post treatment    Plan:  Continue occupational therapy for modified trunk, abdominal, head and neck, UE lymphedema complete decongestive therapy (CDT), exercises, stretches for decongestion and mobility, pain management, increase optimal function; 1x for every other week. Increase frequency as needed.     Subjective   Pt reports she had a rough weekend with hosting Mother's day.  Pt reports noticing left foot cramping due to increased lymphatic fullness.     Pain Assessment:  D\iscomfort/tight abdomen, neck, face.    Objective    Therapy Diagnosis  1. Lymphedema syndrome, postmastectomy        2. Lymphedema of face        3. Lymphedema            Objective       Head and neck edema located in the following area(s): .   Face: face (bilaterally) midface nose upper lip   Cheek: full bilaterally  Jowl/Jaw line: full bilaterally   Lower Eyelid: puffy bilaterally     Neck, chest full.   Additional Information: Full abdomen, firm throughout  Face/chin/jaw full.  Voice with congestion.      Full shoulder, clavicular area.    Treatment   55 minutes    Manual Therapy 55    OT provided manual lymph drainage to abdomen both superficial and deep abdominal paired with diaphragmatic breathing, BLEs, B trunk, head and neck (anterior and posterior).   -decreased fullness post treatment .

## 2025-05-27 ENCOUNTER — APPOINTMENT (OUTPATIENT)
Dept: OCCUPATIONAL THERAPY | Facility: CLINIC | Age: 60
End: 2025-05-27
Payer: COMMERCIAL

## 2025-05-27 DIAGNOSIS — I89.0 LYMPHEDEMA OF FACE: ICD-10-CM

## 2025-05-27 DIAGNOSIS — I97.2 LYMPHEDEMA SYNDROME, POSTMASTECTOMY: Primary | ICD-10-CM

## 2025-05-27 DIAGNOSIS — I89.0 LYMPHEDEMA: ICD-10-CM

## 2025-05-27 PROCEDURE — 97140 MANUAL THERAPY 1/> REGIONS: CPT | Mod: GO | Performed by: OCCUPATIONAL THERAPIST

## 2025-05-27 NOTE — PROGRESS NOTES
Occupational Therapy    Occupational Therapy Treatment    Name: Miroslava Roman  MRN: [id number]  : 1965  Date: 25    Time Entry:    Visit #12 in     Assessment:  Pt demonstrated decreased pain and tightness in BLEs, especially at hips, post treatment     Plan:  Continue occupational therapy for modified trunk, abdominal, head and neck, UE lymphedema complete decongestive therapy (CDT), exercises, stretches for decongestion and mobility, pain management, increase optimal function; 1x for every other week. Increase frequency as needed.     Subjective   Pt reports she got sun burned on the back of her neck while gardening yesterday.     Pain Assessment:  D\iscomfort/tight abdomen, neck, face.    Objective    Therapy Diagnosis  1. Lymphedema syndrome, postmastectomy        2. Lymphedema of face        3. Lymphedema            Objective       Head and neck edema located in the following area(s): .   Face: face (bilaterally) midface nose upper lip   Cheek: full bilaterally  Jowl/Jaw line: full bilaterally   Lower Eyelid: puffy bilaterally     Neck, chest full.   Additional Information: Full abdomen, firm throughout  Face/chin/jaw full.  Voice with congestion.      Full shoulder, clavicular area.    Treatment   58 minutes    Manual Therapy 58    OT provided manual lymph drainage to abdomen both superficial and deep abdominal paired with diaphragmatic breathing, BLEs, B trunk, head and neck (anterior only d/t sunburn on back of neck)  *OT provided increased focus to BLEs   -decreased fullness post treatment .

## 2025-06-02 ENCOUNTER — APPOINTMENT (OUTPATIENT)
Dept: OCCUPATIONAL THERAPY | Facility: CLINIC | Age: 60
End: 2025-06-02
Payer: COMMERCIAL

## 2025-06-02 DIAGNOSIS — I89.0 LYMPHEDEMA: ICD-10-CM

## 2025-06-02 DIAGNOSIS — I89.0 LYMPHEDEMA OF FACE: ICD-10-CM

## 2025-06-02 DIAGNOSIS — I97.2 LYMPHEDEMA SYNDROME, POSTMASTECTOMY: Primary | ICD-10-CM

## 2025-06-02 PROCEDURE — 97140 MANUAL THERAPY 1/> REGIONS: CPT | Mod: GO | Performed by: OCCUPATIONAL THERAPIST

## 2025-06-02 NOTE — PROGRESS NOTES
Occupational Therapy    Occupational Therapy Treatment    Name: Miroslava Roman  MRN: [id number]  : 1965  Date: 25    Time Entry:    Visit #12 in     Assessment:  OT provided increased fullness to head and neck on today's date.  Pt demonstrated decreased fullness post treatment     Plan:  Continue occupational therapy for modified trunk, abdominal, head and neck, UE lymphedema complete decongestive therapy (CDT), exercises, stretches for decongestion and mobility, pain management, increase optimal function; 1x for every other week. Increase frequency as needed.     Subjective   Pt reports  she went to the chiropractor last week and the treatment helped loosen her jaw, however swelling in head and neck remains     Pain Assessment:  D\iscomfort/tight abdomen, neck, face.    Objective    Therapy Diagnosis  1. Lymphedema syndrome, postmastectomy        2. Lymphedema of face        3. Lymphedema            Objective       Head and neck edema located in the following area(s): .   Face: face (bilaterally) midface nose upper lip   Cheek: full bilaterally  Jowl/Jaw line: full bilaterally   Lower Eyelid: puffy bilaterally     Neck, chest full.   Additional Information: Full abdomen, firm throughout  Face/chin/jaw full.  Voice with congestion.      Full shoulder, clavicular area.    Treatment   39 minutes    Manual Therapy 39    OT provided manual lymph drainage to abdomen both superficial and deep abdominal paired with diaphragmatic breathing, B trunk, head and neck (anterior and posterior)   -decreased fullness post treatment .

## 2025-06-17 ENCOUNTER — APPOINTMENT (OUTPATIENT)
Dept: OCCUPATIONAL THERAPY | Facility: CLINIC | Age: 60
End: 2025-06-17
Payer: COMMERCIAL

## 2025-06-17 DIAGNOSIS — I89.0 LYMPHEDEMA OF FACE: ICD-10-CM

## 2025-06-17 DIAGNOSIS — M43.6 NECK STIFFNESS: ICD-10-CM

## 2025-06-17 DIAGNOSIS — I97.2 LYMPHEDEMA SYNDROME, POSTMASTECTOMY: Primary | ICD-10-CM

## 2025-06-17 DIAGNOSIS — I89.0 LYMPHEDEMA: ICD-10-CM

## 2025-06-17 PROCEDURE — 97140 MANUAL THERAPY 1/> REGIONS: CPT | Mod: GO

## 2025-06-17 PROCEDURE — 97110 THERAPEUTIC EXERCISES: CPT | Mod: GO

## 2025-06-17 NOTE — PROGRESS NOTES
Occupational Therapy    Occupational Therapy Treatment    Name: Miroslava Roman  MRN: [id number]  : 1965  Date: 2025    Time Entry:  Time Calculation  Start Time: 1430  Stop Time: 1524  Time Calculation (min): 54 min        OT Therapeutic Procedures Time Entry  Therapeutic Exercise Time Entry: 14  Manual Therapy Time Entry: 40              Visit #14 in     Assessment:  Pt demonstrated fullness trunk, neck, face, eyes puffy.    Voice sounded congested.  Pt demonstrated decreased fullness, discomfort post manual lymph drainage (MLD) treatment, exercises/stretches.   Voice quality improvement noted post tx, pt reports less strain.     Plan:  Continue occupational therapy for modified trunk, abdominal, head and neck, UE lymphedema complete decongestive therapy (CDT), exercises, stretches for decongestion and mobility, pain management, increase optimal function; 1x for every other week. Increase frequency as needed.     Subjective   Pt reports her eyes are swollen today.  Pt was in [place], returned this past  night.    Pain Assessment:  Discomfort/tight abdomen, neck, face.    Objective    Therapy Diagnosis  1. Lymphedema syndrome, postmastectomy        2. Lymphedema of face        3. Lymphedema        4. Neck stiffness            Objective       Head and neck edema located in the following area(s): .   Face: face (bilaterally) midface nose upper lip   Cheek: full bilaterally  Jowl/Jaw line: full bilaterally   Lower Eyelid: puffy bilaterally     Neck, chest full.   Additional Information: Full abdomen, firm throughout  Face/chin/jaw full.  Voice with congestion.      Full shoulder, clavicular area.    Treatment   54 minutes    Manual Therapy 40  OT provided manual lymph drainage to abdomen both superficial and deep abdominal paired with diaphragmatic breathing.   OT clear trunk, chest, neck, face, extra focus under eyes.  OT provide gentle soft tissue techniques with MLD.  -decreased fullness post  treatment .    Ther Ex 14  Intermittent ROM exercise with MLD (manual lymph drainage).  Face, neck, shoulder AROM with deep breathing.  Supine, knees to chest with hip rotation x5  Bridge x5  Quadriped trunk ext, flex stretches, hip flex stretches.  Pt report less tightness after tx.

## 2025-07-01 ENCOUNTER — APPOINTMENT (OUTPATIENT)
Dept: OCCUPATIONAL THERAPY | Facility: CLINIC | Age: 60
End: 2025-07-01
Payer: COMMERCIAL

## 2025-07-01 DIAGNOSIS — I97.2 LYMPHEDEMA SYNDROME, POSTMASTECTOMY: Primary | ICD-10-CM

## 2025-07-01 DIAGNOSIS — I89.0 LYMPHEDEMA OF FACE: ICD-10-CM

## 2025-07-01 DIAGNOSIS — I89.0 LYMPHEDEMA: ICD-10-CM

## 2025-07-01 PROCEDURE — 97140 MANUAL THERAPY 1/> REGIONS: CPT | Mod: GO | Performed by: OCCUPATIONAL THERAPIST

## 2025-07-01 NOTE — PROGRESS NOTES
Occupational Therapy    Occupational Therapy Treatment    Name: Miroslava Roman  MRN: [id number]  : 1965  Date: 2025          Visit #15 in     Assessment:  Pt demonstrated decreased abdominal fullness and head/neck lymphedema post manual lymph drainage     Plan:  Continue occupational therapy for modified trunk, abdominal, head and neck, UE lymphedema complete decongestive therapy (CDT), exercises, stretches for decongestion and mobility, pain management, increase optimal function; 1x for every other week. Increase frequency as needed.     Subjective   Pt reports she had a difficult weekend, causing her right eye to swell nearly shut yesterday.  Pt reports she was able to improve swelling with self manual lymph drainage but swelling remains     Pain Assessment:  Discomfort/tight abdomen, neck, face.    Objective    Therapy Diagnosis  1. Lymphedema syndrome, postmastectomy        2. Lymphedema of face        3. Lymphedema            Objective       Head and neck edema located in the following area(s): .   Face: face (bilaterally) midface nose upper lip   Cheek: full bilaterally  Jowl/Jaw line: full bilaterally   Lower Eyelid: puffy bilaterally     Neck, chest full.   Additional Information: Full abdomen, firm throughout  Face/chin/jaw full.  Voice with congestion.      Full shoulder, clavicular area.    Treatment   55 minutes    Manual Therapy 55  OT provided manual lymph drainage to abdomen both superficial and deep abdominal paired with diaphragmatic breathing.   OT provided manual lymph drainage to B trunk, chest, neck and face  -decreased fullness post treatment

## 2025-07-17 ENCOUNTER — APPOINTMENT (OUTPATIENT)
Dept: OCCUPATIONAL THERAPY | Facility: CLINIC | Age: 60
End: 2025-07-17
Payer: COMMERCIAL

## 2025-07-17 DIAGNOSIS — I89.0 LYMPHEDEMA OF FACE: ICD-10-CM

## 2025-07-17 DIAGNOSIS — I89.0 LYMPHEDEMA: ICD-10-CM

## 2025-07-17 DIAGNOSIS — I97.2 LYMPHEDEMA SYNDROME, POSTMASTECTOMY: Primary | ICD-10-CM

## 2025-07-17 PROCEDURE — 97140 MANUAL THERAPY 1/> REGIONS: CPT | Mod: GO

## 2025-07-17 PROCEDURE — 97110 THERAPEUTIC EXERCISES: CPT | Mod: GO

## 2025-07-17 NOTE — PROGRESS NOTES
Occupational Therapy    Occupational Therapy Treatment    Name: Miroslava oRman  MRN: [id number]  : 1965  Date: 25    Time Entry:  Time Calculation  Start Time: 905  Stop Time: 950  Time Calculation (min): 45 min        OT Therapeutic Procedures Time Entry  Therapeutic Exercise Time Entry: 10  Manual Therapy Time Entry: 35              Visit #15 in     Assessment:  Pt demonstrated decreased abdominal fullness, decreased face/neck fullness/lymphedema, decreased right axilla fullness/lymphedema post manual lymph drainage (MLD).  Upgraded decongestive stretches instructed, added to HEP.    Plan:  Continue occupational therapy for modified trunk, abdominal, head and neck, UE lymphedema complete decongestive therapy (CDT), exercises, stretches for decongestion and mobility, pain management, increase optimal function; 1x for every other week. Increase frequency as needed.     Subjective   Pt reports less eye swelling, feels better. Still fullness in areas.  Less voice congestion today.    Pain Assessment:  Discomfort/tight abdomen, neck, face.    Objective    Therapy Diagnosis  1. Lymphedema syndrome, postmastectomy        2. Lymphedema of face        3. Lymphedema            Objective       Head and neck edema located in the following area(s): .   Face: face (bilaterally) midface nose upper lip   Cheek: full bilaterally  Jowl/Jaw line: full bilaterally   Lower Eyelid: puffy bilaterally     Neck, chest full.   Additional Information: Full abdomen, firm throughout  Face/chin/jaw full.  Voice with congestion.       Full shoulder, clavicular area.    Treatment  45 minutes    Manual Therapy 35  OT provided manual lymph drainage to abdomen both superficial and deep abdominal paired with diaphragmatic breathing.   OT provided manual lymph drainage to B trunk, chest, neck and face.  MLD to right axilla due to fullness.  Gentle soft tissue techniques with MLD tx.  -decreased fullness post treatment     Ther Ex  10  Prone on mat, weight shifts with stretching arms over head, one at a time x5 x2 sets  Axillary exercises/stretches: hands on shoulders, upper trunk rotation, side bending x5 x2 sets. Cues to isolate movements.  Exercises added to HEP.

## 2025-07-29 ENCOUNTER — APPOINTMENT (OUTPATIENT)
Dept: OCCUPATIONAL THERAPY | Facility: CLINIC | Age: 60
End: 2025-07-29
Payer: COMMERCIAL

## 2025-07-29 DIAGNOSIS — I89.0 LYMPHEDEMA OF FACE: ICD-10-CM

## 2025-07-29 DIAGNOSIS — I89.0 LYMPHEDEMA: ICD-10-CM

## 2025-07-29 DIAGNOSIS — I97.2 LYMPHEDEMA SYNDROME, POSTMASTECTOMY: Primary | ICD-10-CM

## 2025-08-04 ENCOUNTER — APPOINTMENT (OUTPATIENT)
Dept: OCCUPATIONAL THERAPY | Facility: CLINIC | Age: 60
End: 2025-08-04
Payer: COMMERCIAL

## 2025-08-04 DIAGNOSIS — M43.6 NECK STIFFNESS: ICD-10-CM

## 2025-08-04 DIAGNOSIS — I89.0 LYMPHEDEMA OF FACE: ICD-10-CM

## 2025-08-04 DIAGNOSIS — I89.0 LYMPHEDEMA: ICD-10-CM

## 2025-08-04 DIAGNOSIS — I97.2 LYMPHEDEMA SYNDROME, POSTMASTECTOMY: Primary | ICD-10-CM

## 2025-08-04 PROCEDURE — 97140 MANUAL THERAPY 1/> REGIONS: CPT | Mod: GO

## 2025-08-04 PROCEDURE — 97110 THERAPEUTIC EXERCISES: CPT | Mod: GO

## 2025-08-04 NOTE — PROGRESS NOTES
Occupational Therapy    Occupational Therapy Treatment    Name: Miroslava Roman  MRN: [id number]  : 1965  Date: 25    Time Entry:  Time Calculation  Start Time: 1017  Stop Time: 1056  Time Calculation (min): 39 min        OT Therapeutic Procedures Time Entry  Therapeutic Exercise Time Entry: 10  Manual Therapy Time Entry: 29              Visit #16 in     Assessment:  [place] session as pt slept in, arrived late.  Pt reports she has been ill, still voice congestion, intermittent cough though improved.  OT provide manual lymph drainage tx to pt tolerance, trunk, face, neck.  Tx to pt tolerance.  Softened tissue texture post tx.   Pt reports she feels less full, tight.  Pt will continue with HEP for further decongestion/management.     Plan:  Continue occupational therapy for modified trunk, abdominal, head and neck, UE lymphedema complete decongestive therapy (CDT), exercises, stretches for decongestion and mobility, pain management, increase optimal function; 1x for every other week. Increase frequency as needed.     Subjective   Pt was ill last week, is now feeling better.  Voice congestion today.    Pain Assessment:  Discomfort/tight lower abdomen, neck, face.    Objective    Therapy Diagnosis  1. Lymphedema syndrome, postmastectomy        2. Lymphedema of face        3. Lymphedema        4. Neck stiffness            Objective       Head and neck edema located in the following area(s): .   Face: face (bilaterally) midface nose upper lip   Cheek: full bilaterally  Jowl/Jaw line: full bilaterally   Lower Eyelid: puffy bilaterally     Neck, chest full.   Additional Information: Full abdomen, firm throughout  Voice with congestion.       Neck, face, eyes full.    Treatment  39 minutes    Manual Therapy 29  OT provided manual lymph drainage to abdomen both superficial and deep abdominal paired with diaphragmatic breathing. Repeat abdomen at end of session.  OT provided manual lymph drainage to B trunk,  chest, neck and face, eyes.  OT utilize both ant and post neck pathways for decongestion.  OT apply gentle soft tissue techniques with MLD tx.  Decreased fullness, tightness noted post treatment.  Pt voice sound improved, less congested post tx.    Ther Ex 10  Supine on mat, knee flexion/rotations, bridges x5 each.  Seated, trunk rotation, shoulder rolls, neck ROM, stretches.  Exercises done intermittently with MLD tx.  Pt notes feeling less congested with tx.

## 2025-08-18 ENCOUNTER — APPOINTMENT (OUTPATIENT)
Dept: OCCUPATIONAL THERAPY | Facility: CLINIC | Age: 60
End: 2025-08-18
Payer: COMMERCIAL

## 2025-08-18 DIAGNOSIS — I97.2 LYMPHEDEMA SYNDROME, POSTMASTECTOMY: Primary | ICD-10-CM

## 2025-08-18 DIAGNOSIS — M43.6 NECK STIFFNESS: ICD-10-CM

## 2025-08-18 DIAGNOSIS — I89.0 LYMPHEDEMA: ICD-10-CM

## 2025-08-18 DIAGNOSIS — I89.0 LYMPHEDEMA OF FACE: ICD-10-CM

## 2025-08-18 PROCEDURE — 97140 MANUAL THERAPY 1/> REGIONS: CPT | Mod: GO

## 2025-08-18 PROCEDURE — 97110 THERAPEUTIC EXERCISES: CPT | Mod: GO

## 2025-08-19 ENCOUNTER — PATIENT OUTREACH (OUTPATIENT)
Dept: CARE COORDINATION | Facility: CLINIC | Age: 60
End: 2025-08-19
Payer: COMMERCIAL

## 2025-08-19 DIAGNOSIS — Z12.31 ENCOUNTER FOR SCREENING MAMMOGRAM FOR BREAST CANCER: ICD-10-CM

## 2025-09-03 ENCOUNTER — TREATMENT (OUTPATIENT)
Dept: OCCUPATIONAL THERAPY | Facility: CLINIC | Age: 60
End: 2025-09-03
Payer: COMMERCIAL

## 2025-09-03 DIAGNOSIS — I89.0 LYMPHEDEMA: ICD-10-CM

## 2025-09-03 DIAGNOSIS — M43.6 NECK STIFFNESS: ICD-10-CM

## 2025-09-03 DIAGNOSIS — I97.2 LYMPHEDEMA SYNDROME, POSTMASTECTOMY: Primary | ICD-10-CM

## 2025-09-03 DIAGNOSIS — I89.0 LYMPHEDEMA OF FACE: ICD-10-CM

## 2025-09-03 PROCEDURE — 97140 MANUAL THERAPY 1/> REGIONS: CPT | Mod: GO

## 2025-09-03 PROCEDURE — 97110 THERAPEUTIC EXERCISES: CPT | Mod: GO

## 2025-09-23 ENCOUNTER — APPOINTMENT (OUTPATIENT)
Dept: OCCUPATIONAL THERAPY | Facility: CLINIC | Age: 60
End: 2025-09-23
Payer: COMMERCIAL

## 2025-09-23 DIAGNOSIS — I89.0 LYMPHEDEMA OF FACE: ICD-10-CM

## 2025-09-23 DIAGNOSIS — M43.6 NECK STIFFNESS: ICD-10-CM

## 2025-09-23 DIAGNOSIS — I89.0 LYMPHEDEMA: ICD-10-CM

## 2025-09-23 DIAGNOSIS — I97.2 LYMPHEDEMA SYNDROME, POSTMASTECTOMY: Primary | ICD-10-CM

## 2025-10-10 ENCOUNTER — APPOINTMENT (OUTPATIENT)
Dept: OCCUPATIONAL THERAPY | Facility: CLINIC | Age: 60
End: 2025-10-10
Payer: COMMERCIAL

## 2025-10-10 DIAGNOSIS — I89.0 LYMPHEDEMA OF FACE: ICD-10-CM

## 2025-10-10 DIAGNOSIS — M43.6 NECK STIFFNESS: ICD-10-CM

## 2025-10-10 DIAGNOSIS — I89.0 LYMPHEDEMA: ICD-10-CM

## 2025-10-10 DIAGNOSIS — I97.2 LYMPHEDEMA SYNDROME, POSTMASTECTOMY: Primary | ICD-10-CM

## 2025-10-24 ENCOUNTER — APPOINTMENT (OUTPATIENT)
Dept: OCCUPATIONAL THERAPY | Facility: CLINIC | Age: 60
End: 2025-10-24
Payer: COMMERCIAL

## 2025-10-24 DIAGNOSIS — I89.0 LYMPHEDEMA OF FACE: ICD-10-CM

## 2025-10-24 DIAGNOSIS — M43.6 NECK STIFFNESS: ICD-10-CM

## 2025-10-24 DIAGNOSIS — I89.0 LYMPHEDEMA: ICD-10-CM

## 2025-10-24 DIAGNOSIS — I97.2 LYMPHEDEMA SYNDROME, POSTMASTECTOMY: Primary | ICD-10-CM

## 2025-11-11 ENCOUNTER — APPOINTMENT (OUTPATIENT)
Dept: OCCUPATIONAL THERAPY | Facility: CLINIC | Age: 60
End: 2025-11-11
Payer: COMMERCIAL

## 2025-11-11 DIAGNOSIS — M43.6 NECK STIFFNESS: ICD-10-CM

## 2025-11-11 DIAGNOSIS — I89.0 LYMPHEDEMA OF FACE: ICD-10-CM

## 2025-11-11 DIAGNOSIS — I97.2 LYMPHEDEMA SYNDROME, POSTMASTECTOMY: Primary | ICD-10-CM

## 2025-11-11 DIAGNOSIS — I89.0 LYMPHEDEMA: ICD-10-CM

## 2026-02-10 ENCOUNTER — APPOINTMENT (OUTPATIENT)
Dept: PRIMARY CARE | Facility: CLINIC | Age: 61
End: 2026-02-10
Payer: COMMERCIAL

## 2026-03-10 ENCOUNTER — APPOINTMENT (OUTPATIENT)
Dept: DERMATOLOGY | Facility: CLINIC | Age: 61
End: 2026-03-10
Payer: COMMERCIAL